# Patient Record
Sex: MALE | Race: ASIAN | NOT HISPANIC OR LATINO | Employment: OTHER | URBAN - METROPOLITAN AREA
[De-identification: names, ages, dates, MRNs, and addresses within clinical notes are randomized per-mention and may not be internally consistent; named-entity substitution may affect disease eponyms.]

---

## 2017-01-25 ENCOUNTER — COMMUNICATION - HEALTHEAST (OUTPATIENT)
Dept: FAMILY MEDICINE | Facility: CLINIC | Age: 67
End: 2017-01-25

## 2017-01-30 ENCOUNTER — OFFICE VISIT - HEALTHEAST (OUTPATIENT)
Dept: FAMILY MEDICINE | Facility: CLINIC | Age: 67
End: 2017-01-30

## 2017-01-30 DIAGNOSIS — R07.89 ATYPICAL CHEST PAIN: ICD-10-CM

## 2017-01-30 DIAGNOSIS — F43.10 POSTTRAUMATIC STRESS DISORDER: ICD-10-CM

## 2017-01-30 ASSESSMENT — MIFFLIN-ST. JEOR: SCORE: 1417.17

## 2017-02-15 ENCOUNTER — OFFICE VISIT - HEALTHEAST (OUTPATIENT)
Dept: FAMILY MEDICINE | Facility: CLINIC | Age: 67
End: 2017-02-15

## 2017-02-15 DIAGNOSIS — R05.9 COUGH: ICD-10-CM

## 2017-02-17 ENCOUNTER — COMMUNICATION - HEALTHEAST (OUTPATIENT)
Dept: FAMILY MEDICINE | Facility: CLINIC | Age: 67
End: 2017-02-17

## 2017-02-21 ENCOUNTER — COMMUNICATION - HEALTHEAST (OUTPATIENT)
Dept: FAMILY MEDICINE | Facility: CLINIC | Age: 67
End: 2017-02-21

## 2017-03-02 ENCOUNTER — OFFICE VISIT - HEALTHEAST (OUTPATIENT)
Dept: FAMILY MEDICINE | Facility: CLINIC | Age: 67
End: 2017-03-02

## 2017-03-02 DIAGNOSIS — R10.9 ABDOMINAL PAIN: ICD-10-CM

## 2017-03-02 DIAGNOSIS — F43.10 POSTTRAUMATIC STRESS DISORDER: ICD-10-CM

## 2017-03-02 ASSESSMENT — MIFFLIN-ST. JEOR: SCORE: 1444.39

## 2017-03-29 ENCOUNTER — COMMUNICATION - HEALTHEAST (OUTPATIENT)
Dept: FAMILY MEDICINE | Facility: CLINIC | Age: 67
End: 2017-03-29

## 2017-03-29 DIAGNOSIS — R05.9 COUGH: ICD-10-CM

## 2017-03-30 ENCOUNTER — AMBULATORY - HEALTHEAST (OUTPATIENT)
Dept: FAMILY MEDICINE | Facility: CLINIC | Age: 67
End: 2017-03-30

## 2017-04-04 ENCOUNTER — RECORDS - HEALTHEAST (OUTPATIENT)
Dept: GENERAL RADIOLOGY | Facility: CLINIC | Age: 67
End: 2017-04-04

## 2017-04-04 ENCOUNTER — OFFICE VISIT - HEALTHEAST (OUTPATIENT)
Dept: FAMILY MEDICINE | Facility: CLINIC | Age: 67
End: 2017-04-04

## 2017-04-04 DIAGNOSIS — R05.9 COUGH: ICD-10-CM

## 2017-04-04 DIAGNOSIS — J18.9 COMMUNITY ACQUIRED PNEUMONIA: ICD-10-CM

## 2017-04-04 DIAGNOSIS — R05.3 CHRONIC COUGH: ICD-10-CM

## 2017-04-04 ASSESSMENT — MIFFLIN-ST. JEOR: SCORE: 1405.83

## 2017-04-13 ENCOUNTER — OFFICE VISIT - HEALTHEAST (OUTPATIENT)
Dept: FAMILY MEDICINE | Facility: CLINIC | Age: 67
End: 2017-04-13

## 2017-04-13 DIAGNOSIS — R05.9 COUGH: ICD-10-CM

## 2017-04-13 DIAGNOSIS — F43.10 PTSD (POST-TRAUMATIC STRESS DISORDER): ICD-10-CM

## 2017-04-13 ASSESSMENT — MIFFLIN-ST. JEOR: SCORE: 1414.9

## 2017-05-30 ENCOUNTER — COMMUNICATION - HEALTHEAST (OUTPATIENT)
Dept: NURSING | Facility: CLINIC | Age: 67
End: 2017-05-30

## 2017-06-07 ENCOUNTER — AMBULATORY - HEALTHEAST (OUTPATIENT)
Dept: CARE COORDINATION | Facility: CLINIC | Age: 67
End: 2017-06-07

## 2017-07-13 ENCOUNTER — OFFICE VISIT - HEALTHEAST (OUTPATIENT)
Dept: FAMILY MEDICINE | Facility: CLINIC | Age: 67
End: 2017-07-13

## 2017-07-13 DIAGNOSIS — F43.10 PTSD (POST-TRAUMATIC STRESS DISORDER): ICD-10-CM

## 2017-07-13 DIAGNOSIS — F33.41 RECURRENT MAJOR DEPRESSIVE DISORDER, IN PARTIAL REMISSION (H): ICD-10-CM

## 2017-07-13 DIAGNOSIS — R20.2 LEFT HAND PARESTHESIA: ICD-10-CM

## 2017-07-13 ASSESSMENT — MIFFLIN-ST. JEOR: SCORE: 1349.7

## 2017-08-29 ENCOUNTER — AMBULATORY - HEALTHEAST (OUTPATIENT)
Dept: LAB | Facility: CLINIC | Age: 67
End: 2017-08-29

## 2017-08-29 DIAGNOSIS — F33.2 MAJOR DEPRESSIVE DISORDER, RECURRENT EPISODE, SEVERE (H): ICD-10-CM

## 2017-08-29 LAB
CHOLEST SERPL-MCNC: 170 MG/DL
FASTING STATUS PATIENT QL REPORTED: NO
HBA1C MFR BLD: 5.9 % (ref 3.5–6)
HDLC SERPL-MCNC: 34 MG/DL
LDLC SERPL CALC-MCNC: 100 MG/DL
TRIGL SERPL-MCNC: 178 MG/DL

## 2017-10-25 ENCOUNTER — COMMUNICATION - HEALTHEAST (OUTPATIENT)
Dept: FAMILY MEDICINE | Facility: CLINIC | Age: 67
End: 2017-10-25

## 2017-10-30 ENCOUNTER — RECORDS - HEALTHEAST (OUTPATIENT)
Dept: ADMINISTRATIVE | Facility: OTHER | Age: 67
End: 2017-10-30

## 2017-11-07 ENCOUNTER — OFFICE VISIT - HEALTHEAST (OUTPATIENT)
Dept: FAMILY MEDICINE | Facility: CLINIC | Age: 67
End: 2017-11-07

## 2017-11-07 DIAGNOSIS — F43.10 PTSD (POST-TRAUMATIC STRESS DISORDER): ICD-10-CM

## 2017-11-07 DIAGNOSIS — B18.1 CHRONIC HEPATITIS B VIRUS INFECTION (H): ICD-10-CM

## 2017-11-07 ASSESSMENT — MIFFLIN-ST. JEOR: SCORE: 1405.26

## 2017-11-09 ENCOUNTER — RECORDS - HEALTHEAST (OUTPATIENT)
Dept: ADMINISTRATIVE | Facility: OTHER | Age: 67
End: 2017-11-09

## 2017-11-27 ENCOUNTER — COMMUNICATION - HEALTHEAST (OUTPATIENT)
Dept: TELEHEALTH | Facility: CLINIC | Age: 67
End: 2017-11-27

## 2017-11-27 ENCOUNTER — HOSPITAL ENCOUNTER (OUTPATIENT)
Dept: ULTRASOUND IMAGING | Facility: HOSPITAL | Age: 67
Discharge: HOME | End: 2017-11-27
Attending: FAMILY MEDICINE

## 2017-11-27 DIAGNOSIS — B18.1 CHRONIC HEPATITIS B VIRUS INFECTION (H): ICD-10-CM

## 2017-12-05 ENCOUNTER — COMMUNICATION - HEALTHEAST (OUTPATIENT)
Dept: FAMILY MEDICINE | Facility: CLINIC | Age: 67
End: 2017-12-05

## 2017-12-12 ENCOUNTER — RECORDS - HEALTHEAST (OUTPATIENT)
Dept: ADMINISTRATIVE | Facility: OTHER | Age: 67
End: 2017-12-12

## 2018-02-12 ENCOUNTER — OFFICE VISIT - HEALTHEAST (OUTPATIENT)
Dept: FAMILY MEDICINE | Facility: CLINIC | Age: 68
End: 2018-02-12

## 2018-02-12 DIAGNOSIS — R03.0 ELEVATED BLOOD PRESSURE READING: ICD-10-CM

## 2018-02-12 DIAGNOSIS — F43.10 PTSD (POST-TRAUMATIC STRESS DISORDER): ICD-10-CM

## 2018-02-12 ASSESSMENT — MIFFLIN-ST. JEOR: SCORE: 1437.02

## 2018-03-28 ENCOUNTER — RECORDS - HEALTHEAST (OUTPATIENT)
Dept: ADMINISTRATIVE | Facility: OTHER | Age: 68
End: 2018-03-28

## 2018-05-07 ENCOUNTER — OFFICE VISIT - HEALTHEAST (OUTPATIENT)
Dept: FAMILY MEDICINE | Facility: CLINIC | Age: 68
End: 2018-05-07

## 2018-05-07 DIAGNOSIS — B18.1 CHRONIC HEPATITIS B VIRUS INFECTION (H): ICD-10-CM

## 2018-05-07 DIAGNOSIS — Z28.39 IMMUNIZATION DEFICIENCY: ICD-10-CM

## 2018-05-07 DIAGNOSIS — R03.0 ELEVATED BLOOD PRESSURE READING: ICD-10-CM

## 2018-05-07 DIAGNOSIS — F43.10 PTSD (POST-TRAUMATIC STRESS DISORDER): ICD-10-CM

## 2018-05-07 LAB — AFP SERPL-MCNC: 4.6 UG/ML

## 2018-05-07 ASSESSMENT — MIFFLIN-ST. JEOR: SCORE: 1435.88

## 2018-05-11 LAB
HBV DNA SERPL NAA+PROBE-ACNC: ABNORMAL [IU]/ML
HBV DNA SERPL NAA+PROBE-LOG IU: 6.4 {LOG_IU}/ML

## 2018-05-15 ENCOUNTER — COMMUNICATION - HEALTHEAST (OUTPATIENT)
Dept: FAMILY MEDICINE | Facility: CLINIC | Age: 68
End: 2018-05-15

## 2018-05-16 ENCOUNTER — COMMUNICATION - HEALTHEAST (OUTPATIENT)
Dept: FAMILY MEDICINE | Facility: CLINIC | Age: 68
End: 2018-05-16

## 2018-05-29 ENCOUNTER — OFFICE VISIT - HEALTHEAST (OUTPATIENT)
Dept: FAMILY MEDICINE | Facility: CLINIC | Age: 68
End: 2018-05-29

## 2018-05-29 DIAGNOSIS — B18.1 CHRONIC HEPATITIS B VIRUS INFECTION (H): ICD-10-CM

## 2018-05-29 DIAGNOSIS — M79.604 RIGHT LEG PAIN: ICD-10-CM

## 2018-05-29 ASSESSMENT — MIFFLIN-ST. JEOR: SCORE: 1417.74

## 2018-06-13 ENCOUNTER — COMMUNICATION - HEALTHEAST (OUTPATIENT)
Dept: FAMILY MEDICINE | Facility: CLINIC | Age: 68
End: 2018-06-13

## 2018-06-13 ENCOUNTER — OFFICE VISIT - HEALTHEAST (OUTPATIENT)
Dept: FAMILY MEDICINE | Facility: CLINIC | Age: 68
End: 2018-06-13

## 2018-06-13 DIAGNOSIS — R55 SYNCOPE, UNSPECIFIED SYNCOPE TYPE: ICD-10-CM

## 2018-06-13 LAB
ALBUMIN SERPL-MCNC: 3.9 G/DL (ref 3.5–5)
ALP SERPL-CCNC: 42 U/L (ref 45–120)
ALT SERPL W P-5'-P-CCNC: 29 U/L (ref 0–45)
ANION GAP SERPL CALCULATED.3IONS-SCNC: 10 MMOL/L (ref 5–18)
AST SERPL W P-5'-P-CCNC: 37 U/L (ref 0–40)
ATRIAL RATE - MUSE: 80 BPM
BILIRUB SERPL-MCNC: 0.8 MG/DL (ref 0–1)
BUN SERPL-MCNC: 17 MG/DL (ref 8–22)
CALCIUM SERPL-MCNC: 9.8 MG/DL (ref 8.5–10.5)
CHLORIDE BLD-SCNC: 104 MMOL/L (ref 98–107)
CO2 SERPL-SCNC: 28 MMOL/L (ref 22–31)
CREAT SERPL-MCNC: 1.2 MG/DL (ref 0.7–1.3)
DIASTOLIC BLOOD PRESSURE - MUSE: NORMAL MMHG
ERYTHROCYTE [DISTWIDTH] IN BLOOD BY AUTOMATED COUNT: 13.2 % (ref 11–14.5)
GFR SERPL CREATININE-BSD FRML MDRD: 60 ML/MIN/1.73M2
GLUCOSE BLD-MCNC: 103 MG/DL (ref 70–125)
HCT VFR BLD AUTO: 47.4 % (ref 40–54)
HGB BLD-MCNC: 15.1 G/DL (ref 14–18)
INTERPRETATION ECG - MUSE: NORMAL
MCH RBC QN AUTO: 25.6 PG (ref 27–34)
MCHC RBC AUTO-ENTMCNC: 31.8 G/DL (ref 32–36)
MCV RBC AUTO: 81 FL (ref 80–100)
P AXIS - MUSE: 47 DEGREES
PLATELET # BLD AUTO: 227 THOU/UL (ref 140–440)
PMV BLD AUTO: 7.4 FL (ref 7–10)
POTASSIUM BLD-SCNC: 4.3 MMOL/L (ref 3.5–5)
PR INTERVAL - MUSE: 174 MS
PROT SERPL-MCNC: 8 G/DL (ref 6–8)
QRS DURATION - MUSE: 86 MS
QT - MUSE: 390 MS
QTC - MUSE: 449 MS
R AXIS - MUSE: 46 DEGREES
RBC # BLD AUTO: 5.89 MILL/UL (ref 4.4–6.2)
SODIUM SERPL-SCNC: 142 MMOL/L (ref 136–145)
SYSTOLIC BLOOD PRESSURE - MUSE: NORMAL MMHG
T AXIS - MUSE: 24 DEGREES
TSH SERPL DL<=0.005 MIU/L-ACNC: 1.92 UIU/ML (ref 0.3–5)
VENTRICULAR RATE- MUSE: 80 BPM
WBC: 8.3 THOU/UL (ref 4–11)

## 2018-06-13 ASSESSMENT — MIFFLIN-ST. JEOR: SCORE: 1422.27

## 2018-11-08 ENCOUNTER — OFFICE VISIT - HEALTHEAST (OUTPATIENT)
Dept: FAMILY MEDICINE | Facility: CLINIC | Age: 68
End: 2018-11-08

## 2018-11-08 DIAGNOSIS — F33.41 RECURRENT MAJOR DEPRESSIVE DISORDER, IN PARTIAL REMISSION (H): ICD-10-CM

## 2018-11-08 DIAGNOSIS — F43.10 PTSD (POST-TRAUMATIC STRESS DISORDER): ICD-10-CM

## 2018-11-08 DIAGNOSIS — R55 SYNCOPE: ICD-10-CM

## 2018-11-08 DIAGNOSIS — Z23 NEED FOR INFLUENZA VACCINATION: ICD-10-CM

## 2018-11-08 DIAGNOSIS — B18.1 CHRONIC HEPATITIS B VIRUS INFECTION (H): ICD-10-CM

## 2018-11-08 ASSESSMENT — MIFFLIN-ST. JEOR: SCORE: 1431.35

## 2018-11-12 ENCOUNTER — AMBULATORY - HEALTHEAST (OUTPATIENT)
Dept: FAMILY MEDICINE | Facility: CLINIC | Age: 68
End: 2018-11-12

## 2018-11-12 DIAGNOSIS — F43.10 PTSD (POST-TRAUMATIC STRESS DISORDER): ICD-10-CM

## 2018-11-13 LAB
HBV DNA SERPL NAA+PROBE-ACNC: 44 [IU]/ML
HBV DNA SERPL NAA+PROBE-LOG IU: 1.6 {LOG_IU}/ML

## 2018-11-14 ENCOUNTER — COMMUNICATION - HEALTHEAST (OUTPATIENT)
Dept: FAMILY MEDICINE | Facility: CLINIC | Age: 68
End: 2018-11-14

## 2018-11-19 ENCOUNTER — COMMUNICATION - HEALTHEAST (OUTPATIENT)
Dept: NURSING | Facility: CLINIC | Age: 68
End: 2018-11-19

## 2018-11-30 ENCOUNTER — AMBULATORY - HEALTHEAST (OUTPATIENT)
Dept: NURSING | Facility: CLINIC | Age: 68
End: 2018-11-30

## 2018-12-04 ENCOUNTER — COMMUNICATION - HEALTHEAST (OUTPATIENT)
Dept: NURSING | Facility: CLINIC | Age: 68
End: 2018-12-04

## 2018-12-06 ENCOUNTER — OFFICE VISIT - HEALTHEAST (OUTPATIENT)
Dept: FAMILY MEDICINE | Facility: CLINIC | Age: 68
End: 2018-12-06

## 2018-12-06 DIAGNOSIS — F43.10 PTSD (POST-TRAUMATIC STRESS DISORDER): ICD-10-CM

## 2018-12-06 DIAGNOSIS — B18.1 CHRONIC HEPATITIS B VIRUS INFECTION (H): ICD-10-CM

## 2018-12-06 ASSESSMENT — MIFFLIN-ST. JEOR: SCORE: 1431.35

## 2018-12-11 ENCOUNTER — HOSPITAL ENCOUNTER (OUTPATIENT)
Dept: ULTRASOUND IMAGING | Facility: HOSPITAL | Age: 68
Discharge: HOME | End: 2018-12-11
Attending: FAMILY MEDICINE

## 2018-12-11 DIAGNOSIS — B18.1 CHRONIC HEPATITIS B VIRUS INFECTION (H): ICD-10-CM

## 2018-12-18 ENCOUNTER — COMMUNICATION - HEALTHEAST (OUTPATIENT)
Dept: NURSING | Facility: CLINIC | Age: 68
End: 2018-12-18

## 2018-12-19 ENCOUNTER — OFFICE VISIT - HEALTHEAST (OUTPATIENT)
Dept: FAMILY MEDICINE | Facility: CLINIC | Age: 68
End: 2018-12-19

## 2018-12-19 DIAGNOSIS — F43.10 PTSD (POST-TRAUMATIC STRESS DISORDER): ICD-10-CM

## 2018-12-19 DIAGNOSIS — N28.89 RENAL MASS, RIGHT: ICD-10-CM

## 2018-12-19 DIAGNOSIS — K74.60 CIRRHOSIS OF LIVER WITHOUT ASCITES, UNSPECIFIED HEPATIC CIRRHOSIS TYPE (H): ICD-10-CM

## 2018-12-19 DIAGNOSIS — B18.1 CHRONIC HEPATITIS B VIRUS INFECTION (H): ICD-10-CM

## 2018-12-19 ASSESSMENT — MIFFLIN-ST. JEOR: SCORE: 1440.64

## 2018-12-21 ENCOUNTER — AMBULATORY - HEALTHEAST (OUTPATIENT)
Dept: CARE COORDINATION | Facility: CLINIC | Age: 68
End: 2018-12-21

## 2018-12-22 ENCOUNTER — HOSPITAL ENCOUNTER (OUTPATIENT)
Dept: CT IMAGING | Facility: HOSPITAL | Age: 68
Discharge: HOME | End: 2018-12-22
Attending: FAMILY MEDICINE

## 2018-12-22 DIAGNOSIS — N28.89 RENAL MASS, RIGHT: ICD-10-CM

## 2018-12-22 LAB
CREAT BLD-MCNC: 1.1 MG/DL
POC GFR AMER AF HE - HISTORICAL: >60 ML/MIN/1.73M2
POC GFR NON AMER AF HE - HISTORICAL: >60 ML/MIN/1.73M2

## 2018-12-26 ENCOUNTER — OFFICE VISIT - HEALTHEAST (OUTPATIENT)
Dept: FAMILY MEDICINE | Facility: CLINIC | Age: 68
End: 2018-12-26

## 2019-01-14 ENCOUNTER — OFFICE VISIT - HEALTHEAST (OUTPATIENT)
Dept: FAMILY MEDICINE | Facility: CLINIC | Age: 69
End: 2019-01-14

## 2019-01-14 DIAGNOSIS — N28.89 RIGHT KIDNEY MASS: ICD-10-CM

## 2019-01-14 DIAGNOSIS — F43.10 PTSD (POST-TRAUMATIC STRESS DISORDER): ICD-10-CM

## 2019-01-14 DIAGNOSIS — B18.1 CHRONIC HEPATITIS B VIRUS INFECTION (H): ICD-10-CM

## 2019-01-14 ASSESSMENT — MIFFLIN-ST. JEOR: SCORE: 1449.65

## 2019-01-15 ENCOUNTER — RECORDS - HEALTHEAST (OUTPATIENT)
Dept: ADMINISTRATIVE | Facility: OTHER | Age: 69
End: 2019-01-15

## 2019-01-23 ENCOUNTER — COMMUNICATION - HEALTHEAST (OUTPATIENT)
Dept: NURSING | Facility: CLINIC | Age: 69
End: 2019-01-23

## 2019-01-24 ENCOUNTER — COMMUNICATION - HEALTHEAST (OUTPATIENT)
Dept: NURSING | Facility: CLINIC | Age: 69
End: 2019-01-24

## 2019-02-06 ENCOUNTER — OFFICE VISIT - HEALTHEAST (OUTPATIENT)
Dept: FAMILY MEDICINE | Facility: CLINIC | Age: 69
End: 2019-02-06

## 2019-02-07 ENCOUNTER — AMBULATORY - HEALTHEAST (OUTPATIENT)
Dept: FAMILY MEDICINE | Facility: CLINIC | Age: 69
End: 2019-02-07

## 2019-02-07 DIAGNOSIS — B18.1 CHRONIC HEPATITIS B VIRUS INFECTION (H): ICD-10-CM

## 2019-02-08 ENCOUNTER — AMBULATORY - HEALTHEAST (OUTPATIENT)
Dept: NURSING | Facility: CLINIC | Age: 69
End: 2019-02-08

## 2019-02-14 ENCOUNTER — COMMUNICATION - HEALTHEAST (OUTPATIENT)
Dept: NURSING | Facility: CLINIC | Age: 69
End: 2019-02-14

## 2019-03-05 ENCOUNTER — COMMUNICATION - HEALTHEAST (OUTPATIENT)
Dept: NURSING | Facility: CLINIC | Age: 69
End: 2019-03-05

## 2019-03-07 ENCOUNTER — COMMUNICATION - HEALTHEAST (OUTPATIENT)
Dept: NURSING | Facility: CLINIC | Age: 69
End: 2019-03-07

## 2019-03-12 ENCOUNTER — OFFICE VISIT - HEALTHEAST (OUTPATIENT)
Dept: FAMILY MEDICINE | Facility: CLINIC | Age: 69
End: 2019-03-12

## 2019-03-12 DIAGNOSIS — B18.1 CHRONIC HEPATITIS B VIRUS INFECTION (H): ICD-10-CM

## 2019-03-12 DIAGNOSIS — K74.60 CIRRHOSIS OF LIVER WITHOUT ASCITES, UNSPECIFIED HEPATIC CIRRHOSIS TYPE (H): ICD-10-CM

## 2019-03-12 DIAGNOSIS — Z28.39 IMMUNIZATION DEFICIENCY: ICD-10-CM

## 2019-03-12 DIAGNOSIS — N28.89 RENAL MASS: ICD-10-CM

## 2019-03-12 DIAGNOSIS — F43.10 PTSD (POST-TRAUMATIC STRESS DISORDER): ICD-10-CM

## 2019-03-12 DIAGNOSIS — Z01.818 PREOPERATIVE EXAMINATION: ICD-10-CM

## 2019-03-12 LAB
ATRIAL RATE - MUSE: 71 BPM
DIASTOLIC BLOOD PRESSURE - MUSE: NORMAL MMHG
ERYTHROCYTE [DISTWIDTH] IN BLOOD BY AUTOMATED COUNT: 12 % (ref 11–14.5)
HCT VFR BLD AUTO: 44.7 % (ref 40–54)
HGB BLD-MCNC: 14.5 G/DL (ref 14–18)
INR PPP: 1.08 (ref 0.9–1.1)
INTERPRETATION ECG - MUSE: NORMAL
MCH RBC QN AUTO: 25.4 PG (ref 27–34)
MCHC RBC AUTO-ENTMCNC: 32.5 G/DL (ref 32–36)
MCV RBC AUTO: 78 FL (ref 80–100)
P AXIS - MUSE: 7 DEGREES
PLATELET # BLD AUTO: 211 THOU/UL (ref 140–440)
PMV BLD AUTO: 8.1 FL (ref 7–10)
PR INTERVAL - MUSE: 146 MS
QRS DURATION - MUSE: 82 MS
QT - MUSE: 384 MS
QTC - MUSE: 417 MS
R AXIS - MUSE: 33 DEGREES
RBC # BLD AUTO: 5.71 MILL/UL (ref 4.4–6.2)
SYSTOLIC BLOOD PRESSURE - MUSE: NORMAL MMHG
T AXIS - MUSE: 12 DEGREES
VENTRICULAR RATE- MUSE: 71 BPM
WBC: 7.2 THOU/UL (ref 4–11)

## 2019-03-12 ASSESSMENT — MIFFLIN-ST. JEOR: SCORE: 1412.64

## 2019-03-13 LAB
ALBUMIN SERPL-MCNC: 4 G/DL (ref 3.5–5)
ALP SERPL-CCNC: 51 U/L (ref 45–120)
ALT SERPL W P-5'-P-CCNC: 17 U/L (ref 0–45)
ANION GAP SERPL CALCULATED.3IONS-SCNC: 12 MMOL/L (ref 5–18)
AST SERPL W P-5'-P-CCNC: 20 U/L (ref 0–40)
BILIRUB SERPL-MCNC: 0.5 MG/DL (ref 0–1)
BUN SERPL-MCNC: 17 MG/DL (ref 8–22)
CALCIUM SERPL-MCNC: 9.8 MG/DL (ref 8.5–10.5)
CHLORIDE BLD-SCNC: 110 MMOL/L (ref 98–107)
CO2 SERPL-SCNC: 22 MMOL/L (ref 22–31)
CREAT SERPL-MCNC: 1.26 MG/DL (ref 0.7–1.3)
GFR SERPL CREATININE-BSD FRML MDRD: 57 ML/MIN/1.73M2
GLUCOSE BLD-MCNC: 89 MG/DL (ref 70–125)
POTASSIUM BLD-SCNC: 4.3 MMOL/L (ref 3.5–5)
PROT SERPL-MCNC: 7.6 G/DL (ref 6–8)
SODIUM SERPL-SCNC: 144 MMOL/L (ref 136–145)

## 2019-03-21 ASSESSMENT — MIFFLIN-ST. JEOR: SCORE: 1412.64

## 2019-03-24 ENCOUNTER — ANESTHESIA - HEALTHEAST (OUTPATIENT)
Dept: SURGERY | Facility: HOSPITAL | Age: 69
End: 2019-03-24

## 2019-03-25 ENCOUNTER — SURGERY - HEALTHEAST (OUTPATIENT)
Dept: SURGERY | Facility: HOSPITAL | Age: 69
End: 2019-03-25

## 2019-03-25 ASSESSMENT — MIFFLIN-ST. JEOR
SCORE: 1389.96
SCORE: 1389.96
SCORE: 1408.1

## 2019-03-26 ASSESSMENT — MIFFLIN-ST. JEOR: SCORE: 1389.96

## 2019-04-04 ENCOUNTER — COMMUNICATION - HEALTHEAST (OUTPATIENT)
Dept: NURSING | Facility: CLINIC | Age: 69
End: 2019-04-04

## 2019-04-09 ENCOUNTER — RECORDS - HEALTHEAST (OUTPATIENT)
Dept: ADMINISTRATIVE | Facility: OTHER | Age: 69
End: 2019-04-09

## 2019-04-18 ENCOUNTER — COMMUNICATION - HEALTHEAST (OUTPATIENT)
Dept: NURSING | Facility: CLINIC | Age: 69
End: 2019-04-18

## 2019-04-29 ENCOUNTER — AMBULATORY - HEALTHEAST (OUTPATIENT)
Dept: CARE COORDINATION | Facility: CLINIC | Age: 69
End: 2019-04-29

## 2019-05-02 ENCOUNTER — OFFICE VISIT - HEALTHEAST (OUTPATIENT)
Dept: FAMILY MEDICINE | Facility: CLINIC | Age: 69
End: 2019-05-02

## 2019-05-02 DIAGNOSIS — F43.10 PTSD (POST-TRAUMATIC STRESS DISORDER): ICD-10-CM

## 2019-05-02 DIAGNOSIS — B18.1 CHRONIC HEPATITIS B VIRUS INFECTION (H): ICD-10-CM

## 2019-05-02 DIAGNOSIS — C64.1 RENAL CELL CARCINOMA, RIGHT (H): ICD-10-CM

## 2019-05-02 ASSESSMENT — MIFFLIN-ST. JEOR: SCORE: 1410.37

## 2019-07-02 ENCOUNTER — OFFICE VISIT - HEALTHEAST (OUTPATIENT)
Dept: FAMILY MEDICINE | Facility: CLINIC | Age: 69
End: 2019-07-02

## 2019-07-02 ENCOUNTER — RECORDS - HEALTHEAST (OUTPATIENT)
Dept: ADMINISTRATIVE | Facility: OTHER | Age: 69
End: 2019-07-02

## 2019-07-02 DIAGNOSIS — B18.1 CHRONIC HEPATITIS B VIRUS INFECTION (H): ICD-10-CM

## 2019-07-02 DIAGNOSIS — F43.10 PTSD (POST-TRAUMATIC STRESS DISORDER): ICD-10-CM

## 2019-07-02 DIAGNOSIS — F33.42 RECURRENT MAJOR DEPRESSIVE DISORDER, IN FULL REMISSION (H): ICD-10-CM

## 2019-07-02 DIAGNOSIS — Z12.11 COLON CANCER SCREENING: ICD-10-CM

## 2019-07-02 ASSESSMENT — MIFFLIN-ST. JEOR: SCORE: 1412.64

## 2019-07-10 ENCOUNTER — COMMUNICATION - HEALTHEAST (OUTPATIENT)
Dept: NURSING | Facility: CLINIC | Age: 69
End: 2019-07-10

## 2019-07-18 ENCOUNTER — COMMUNICATION - HEALTHEAST (OUTPATIENT)
Dept: NURSING | Facility: CLINIC | Age: 69
End: 2019-07-18

## 2019-07-24 ENCOUNTER — COMMUNICATION - HEALTHEAST (OUTPATIENT)
Dept: CARE COORDINATION | Facility: CLINIC | Age: 69
End: 2019-07-24

## 2019-09-05 ENCOUNTER — RECORDS - HEALTHEAST (OUTPATIENT)
Dept: ADMINISTRATIVE | Facility: OTHER | Age: 69
End: 2019-09-05

## 2019-09-05 ENCOUNTER — OFFICE VISIT - HEALTHEAST (OUTPATIENT)
Dept: FAMILY MEDICINE | Facility: CLINIC | Age: 69
End: 2019-09-05

## 2019-09-05 DIAGNOSIS — Z23 IMMUNIZATION DUE: ICD-10-CM

## 2019-09-05 DIAGNOSIS — C64.1 RENAL CELL CARCINOMA, RIGHT (H): ICD-10-CM

## 2019-09-05 DIAGNOSIS — B18.1 CHRONIC HEPATITIS B VIRUS INFECTION (H): ICD-10-CM

## 2019-09-05 DIAGNOSIS — F33.42 RECURRENT MAJOR DEPRESSIVE DISORDER, IN FULL REMISSION (H): ICD-10-CM

## 2019-09-05 ASSESSMENT — MIFFLIN-ST. JEOR: SCORE: 1430.78

## 2019-10-07 ENCOUNTER — AMBULATORY - HEALTHEAST (OUTPATIENT)
Dept: CARE COORDINATION | Facility: CLINIC | Age: 69
End: 2019-10-07

## 2019-10-07 ENCOUNTER — AMBULATORY - HEALTHEAST (OUTPATIENT)
Dept: FAMILY MEDICINE | Facility: CLINIC | Age: 69
End: 2019-10-07

## 2019-10-07 DIAGNOSIS — Z59.71 UNINSURED: ICD-10-CM

## 2019-10-07 DIAGNOSIS — Z59.71 INSURANCE COVERAGE PROBLEMS: ICD-10-CM

## 2019-10-08 ENCOUNTER — RECORDS - HEALTHEAST (OUTPATIENT)
Dept: ADMINISTRATIVE | Facility: OTHER | Age: 69
End: 2019-10-08

## 2019-10-09 ENCOUNTER — COMMUNICATION - HEALTHEAST (OUTPATIENT)
Dept: NURSING | Facility: CLINIC | Age: 69
End: 2019-10-09

## 2019-10-24 ENCOUNTER — COMMUNICATION - HEALTHEAST (OUTPATIENT)
Dept: FAMILY MEDICINE | Facility: CLINIC | Age: 69
End: 2019-10-24

## 2019-11-01 ENCOUNTER — COMMUNICATION - HEALTHEAST (OUTPATIENT)
Dept: NURSING | Facility: CLINIC | Age: 69
End: 2019-11-01

## 2020-01-08 ENCOUNTER — OFFICE VISIT - HEALTHEAST (OUTPATIENT)
Dept: FAMILY MEDICINE | Facility: CLINIC | Age: 70
End: 2020-01-08

## 2020-01-08 DIAGNOSIS — K74.60 CIRRHOSIS OF LIVER WITHOUT ASCITES, UNSPECIFIED HEPATIC CIRRHOSIS TYPE (H): ICD-10-CM

## 2020-01-08 DIAGNOSIS — B18.1 CHRONIC HEPATITIS B VIRUS INFECTION (H): ICD-10-CM

## 2020-01-08 DIAGNOSIS — R73.9 HYPERGLYCEMIA: ICD-10-CM

## 2020-01-08 DIAGNOSIS — C64.1 RENAL CELL CARCINOMA, RIGHT (H): ICD-10-CM

## 2020-01-08 DIAGNOSIS — F33.42 RECURRENT MAJOR DEPRESSIVE DISORDER, IN FULL REMISSION (H): ICD-10-CM

## 2020-01-08 LAB
ALBUMIN SERPL-MCNC: 4.2 G/DL (ref 3.5–5)
ALP SERPL-CCNC: 40 U/L (ref 45–120)
ALT SERPL W P-5'-P-CCNC: 10 U/L (ref 0–45)
ANION GAP SERPL CALCULATED.3IONS-SCNC: 15 MMOL/L (ref 5–18)
AST SERPL W P-5'-P-CCNC: 21 U/L (ref 0–40)
BILIRUB SERPL-MCNC: 0.9 MG/DL (ref 0–1)
BUN SERPL-MCNC: 12 MG/DL (ref 8–22)
CALCIUM SERPL-MCNC: 9.8 MG/DL (ref 8.5–10.5)
CHLORIDE BLD-SCNC: 107 MMOL/L (ref 98–107)
CO2 SERPL-SCNC: 21 MMOL/L (ref 22–31)
CREAT SERPL-MCNC: 1.43 MG/DL (ref 0.7–1.3)
GFR SERPL CREATININE-BSD FRML MDRD: 49 ML/MIN/1.73M2
GLUCOSE BLD-MCNC: 85 MG/DL (ref 70–125)
HBA1C MFR BLD: 6.2 % (ref 3.5–6)
POTASSIUM BLD-SCNC: 4.8 MMOL/L (ref 3.5–5)
PROT SERPL-MCNC: 7.7 G/DL (ref 6–8)
SODIUM SERPL-SCNC: 143 MMOL/L (ref 136–145)

## 2020-01-08 ASSESSMENT — PATIENT HEALTH QUESTIONNAIRE - PHQ9: SUM OF ALL RESPONSES TO PHQ QUESTIONS 1-9: 0

## 2020-01-08 ASSESSMENT — MIFFLIN-ST. JEOR: SCORE: 1419.44

## 2020-01-09 ENCOUNTER — COMMUNICATION - HEALTHEAST (OUTPATIENT)
Dept: NURSING | Facility: CLINIC | Age: 70
End: 2020-01-09

## 2020-01-10 LAB — AFP SERPL-MCNC: 2.2 UG/ML

## 2020-01-11 LAB
HBV DNA SERPL NAA+PROBE-ACNC: <20 [IU]/ML
HBV DNA SERPL NAA+PROBE-LOG IU: <1.3 {LOG_IU}/ML

## 2020-01-13 ENCOUNTER — COMMUNICATION - HEALTHEAST (OUTPATIENT)
Dept: FAMILY MEDICINE | Facility: CLINIC | Age: 70
End: 2020-01-13

## 2020-01-14 ENCOUNTER — HOSPITAL ENCOUNTER (OUTPATIENT)
Dept: ULTRASOUND IMAGING | Facility: HOSPITAL | Age: 70
Discharge: HOME | End: 2020-01-14
Attending: FAMILY MEDICINE

## 2020-01-14 DIAGNOSIS — B18.1 CHRONIC HEPATITIS B VIRUS INFECTION (H): ICD-10-CM

## 2020-01-15 ENCOUNTER — COMMUNICATION - HEALTHEAST (OUTPATIENT)
Dept: FAMILY MEDICINE | Facility: CLINIC | Age: 70
End: 2020-01-15

## 2020-01-15 ENCOUNTER — AMBULATORY - HEALTHEAST (OUTPATIENT)
Dept: FAMILY MEDICINE | Facility: CLINIC | Age: 70
End: 2020-01-15

## 2020-01-15 DIAGNOSIS — N28.9 RENAL LESION: ICD-10-CM

## 2020-01-15 DIAGNOSIS — K76.9 LIVER LESION: ICD-10-CM

## 2020-02-10 ENCOUNTER — OFFICE VISIT - HEALTHEAST (OUTPATIENT)
Dept: FAMILY MEDICINE | Facility: CLINIC | Age: 70
End: 2020-02-10

## 2020-02-10 DIAGNOSIS — E74.39 GLUCOSE INTOLERANCE: ICD-10-CM

## 2020-02-10 DIAGNOSIS — R93.2 ABNORMAL LIVER ULTRASOUND: ICD-10-CM

## 2020-02-10 DIAGNOSIS — M54.50 ACUTE LEFT-SIDED LOW BACK PAIN WITHOUT SCIATICA: ICD-10-CM

## 2020-02-10 DIAGNOSIS — N28.89 RIGHT RENAL MASS: ICD-10-CM

## 2020-02-10 ASSESSMENT — MIFFLIN-ST. JEOR: SCORE: 1426.24

## 2020-02-12 ENCOUNTER — COMMUNICATION - HEALTHEAST (OUTPATIENT)
Dept: FAMILY MEDICINE | Facility: CLINIC | Age: 70
End: 2020-02-12

## 2020-02-12 ENCOUNTER — COMMUNICATION - HEALTHEAST (OUTPATIENT)
Dept: NURSING | Facility: CLINIC | Age: 70
End: 2020-02-12

## 2020-02-12 ENCOUNTER — HOSPITAL ENCOUNTER (OUTPATIENT)
Dept: MRI IMAGING | Facility: HOSPITAL | Age: 70
Discharge: HOME | End: 2020-02-12
Attending: FAMILY MEDICINE

## 2020-02-12 DIAGNOSIS — K76.9 LIVER LESION: ICD-10-CM

## 2020-02-12 DIAGNOSIS — N28.9 RENAL LESION: ICD-10-CM

## 2020-02-12 LAB
CREAT BLD-MCNC: 1.4 MG/DL (ref 0.7–1.3)
GFR SERPL CREATININE-BSD FRML MDRD: 50 ML/MIN/1.73M2

## 2020-04-01 ENCOUNTER — OFFICE VISIT - HEALTHEAST (OUTPATIENT)
Dept: FAMILY MEDICINE | Facility: CLINIC | Age: 70
End: 2020-04-01

## 2020-04-01 DIAGNOSIS — K74.60 CIRRHOSIS OF LIVER WITHOUT ASCITES, UNSPECIFIED HEPATIC CIRRHOSIS TYPE (H): ICD-10-CM

## 2020-04-01 DIAGNOSIS — B18.1 CHRONIC HEPATITIS B VIRUS INFECTION (H): ICD-10-CM

## 2020-04-01 DIAGNOSIS — F43.10 PTSD (POST-TRAUMATIC STRESS DISORDER): ICD-10-CM

## 2020-09-21 ENCOUNTER — COMMUNICATION - HEALTHEAST (OUTPATIENT)
Dept: ADMINISTRATIVE | Facility: CLINIC | Age: 70
End: 2020-09-21

## 2020-09-21 ENCOUNTER — OFFICE VISIT - HEALTHEAST (OUTPATIENT)
Dept: FAMILY MEDICINE | Facility: CLINIC | Age: 70
End: 2020-09-21

## 2020-09-21 DIAGNOSIS — R79.89 ELEVATED SERUM CREATININE: ICD-10-CM

## 2020-09-21 DIAGNOSIS — Z12.11 SCREEN FOR COLON CANCER: ICD-10-CM

## 2020-09-21 DIAGNOSIS — Z28.39 IMMUNIZATION DEFICIENCY: ICD-10-CM

## 2020-09-21 DIAGNOSIS — B18.1 CHRONIC HEPATITIS B VIRUS INFECTION (H): ICD-10-CM

## 2020-09-21 DIAGNOSIS — Z91.89 NEED FOR DENTAL CARE: ICD-10-CM

## 2020-09-21 DIAGNOSIS — K74.60 CIRRHOSIS OF LIVER WITHOUT ASCITES, UNSPECIFIED HEPATIC CIRRHOSIS TYPE (H): ICD-10-CM

## 2020-09-21 DIAGNOSIS — F43.10 PTSD (POST-TRAUMATIC STRESS DISORDER): ICD-10-CM

## 2020-09-21 ASSESSMENT — PATIENT HEALTH QUESTIONNAIRE - PHQ9: SUM OF ALL RESPONSES TO PHQ QUESTIONS 1-9: 0

## 2020-09-21 ASSESSMENT — MIFFLIN-ST. JEOR: SCORE: 1412.64

## 2020-09-23 ENCOUNTER — COMMUNICATION - HEALTHEAST (OUTPATIENT)
Dept: FAMILY MEDICINE | Facility: CLINIC | Age: 70
End: 2020-09-23

## 2020-10-05 LAB
ALBUMIN SERPL-MCNC: 4.3 G/DL (ref 3.5–5)
ALP SERPL-CCNC: 37 U/L (ref 45–120)
ALT SERPL W P-5'-P-CCNC: 12 U/L (ref 0–45)
ANION GAP SERPL CALCULATED.3IONS-SCNC: 8 MMOL/L (ref 5–18)
AST SERPL W P-5'-P-CCNC: 22 U/L (ref 0–40)
BILIRUB SERPL-MCNC: 0.9 MG/DL (ref 0–1)
BUN SERPL-MCNC: 17 MG/DL (ref 8–28)
CALCIUM SERPL-MCNC: 10.1 MG/DL (ref 8.5–10.5)
CHLORIDE BLD-SCNC: 106 MMOL/L (ref 98–107)
CO2 SERPL-SCNC: 29 MMOL/L (ref 22–31)
CREAT SERPL-MCNC: 1.32 MG/DL (ref 0.7–1.3)
GFR SERPL CREATININE-BSD FRML MDRD: 54 ML/MIN/1.73M2
GLUCOSE BLD-MCNC: 113 MG/DL (ref 70–125)
POTASSIUM BLD-SCNC: 4.8 MMOL/L (ref 3.5–5)
PROT SERPL-MCNC: 7.8 G/DL (ref 6–8)
SODIUM SERPL-SCNC: 143 MMOL/L (ref 136–145)

## 2021-03-01 ENCOUNTER — COMMUNICATION - HEALTHEAST (OUTPATIENT)
Dept: FAMILY MEDICINE | Facility: CLINIC | Age: 71
End: 2021-03-01

## 2021-04-06 ENCOUNTER — RECORDS - HEALTHEAST (OUTPATIENT)
Dept: LAB | Facility: CLINIC | Age: 71
End: 2021-04-06

## 2021-04-07 LAB
ANION GAP SERPL CALCULATED.3IONS-SCNC: 10 MMOL/L (ref 5–18)
BUN SERPL-MCNC: 18 MG/DL (ref 8–28)
CALCIUM SERPL-MCNC: 9.3 MG/DL (ref 8.5–10.5)
CHLORIDE BLD-SCNC: 105 MMOL/L (ref 98–107)
CHOLEST SERPL-MCNC: 140 MG/DL
CO2 SERPL-SCNC: 26 MMOL/L (ref 22–31)
CREAT SERPL-MCNC: 1.45 MG/DL (ref 0.7–1.3)
ERYTHROCYTE [DISTWIDTH] IN BLOOD BY AUTOMATED COUNT: 13.2 % (ref 11–14.5)
FASTING STATUS PATIENT QL REPORTED: ABNORMAL
GFR SERPL CREATININE-BSD FRML MDRD: 48 ML/MIN/1.73M2
GLUCOSE BLD-MCNC: 89 MG/DL (ref 70–125)
HCT VFR BLD AUTO: 42.5 % (ref 40–54)
HDLC SERPL-MCNC: 32 MG/DL
HGB BLD-MCNC: 13.3 G/DL (ref 14–18)
LDLC SERPL CALC-MCNC: 82 MG/DL
MCH RBC QN AUTO: 25.3 PG (ref 27–34)
MCHC RBC AUTO-ENTMCNC: 31.3 G/DL (ref 32–36)
MCV RBC AUTO: 81 FL (ref 80–100)
PLATELET # BLD AUTO: 256 THOU/UL (ref 140–440)
PMV BLD AUTO: 10 FL (ref 8.5–12.5)
POTASSIUM BLD-SCNC: 4.1 MMOL/L (ref 3.5–5)
RBC # BLD AUTO: 5.26 MILL/UL (ref 4.4–6.2)
SODIUM SERPL-SCNC: 141 MMOL/L (ref 136–145)
TRIGL SERPL-MCNC: 128 MG/DL
WBC: 6.9 THOU/UL (ref 4–11)

## 2021-04-08 LAB — HBA1C MFR BLD: 6 %

## 2021-05-13 ENCOUNTER — RECORDS - HEALTHEAST (OUTPATIENT)
Dept: ADMINISTRATIVE | Facility: OTHER | Age: 71
End: 2021-05-13

## 2021-05-13 ENCOUNTER — RECORDS - HEALTHEAST (OUTPATIENT)
Dept: SCHEDULING | Facility: CLINIC | Age: 71
End: 2021-05-13

## 2021-05-13 DIAGNOSIS — Z85.528 PERSONAL HISTORY OF OTHER MALIGNANT NEOPLASM OF KIDNEY: ICD-10-CM

## 2021-05-26 ASSESSMENT — PATIENT HEALTH QUESTIONNAIRE - PHQ9: SUM OF ALL RESPONSES TO PHQ QUESTIONS 1-9: 0

## 2021-05-27 ASSESSMENT — PATIENT HEALTH QUESTIONNAIRE - PHQ9: SUM OF ALL RESPONSES TO PHQ QUESTIONS 1-9: 0

## 2021-05-27 NOTE — ANESTHESIA CARE TRANSFER NOTE
Last vitals:   Vitals:    03/25/19 1404   BP: 158/74   Pulse: 66   Resp: 18   Temp: 36.4  C (97.5  F)   SpO2: 100%     Patient's level of consciousness is drowsy but awake. Appears comfortable. Interpretor en route to PACU. VSS.  Spontaneous respirations: yes  Maintains airway independently: yes  Dentition unchanged: yes  Oropharynx: oropharynx clear of all foreign objects    QCDR Measures:  ASA# 20 - Surgical Safety Checklist: WHO surgical safety checklist completed prior to induction    PQRS# 430 - Adult PONV Prevention: 4558F - Pt received => 2 anti-emetic agents (different classes) preop & intraop  ASA# 8 - Peds PONV Prevention: NA - Not pediatric patient, not GA or 2 or more risk factors NOT present  PQRS# 424 - Yessica-op Temp Management: 4559F - At least one body temp DOCUMENTED => 35.5C or 95.9F within required timeframe  PQRS# 426 - PACU Transfer Protocol: - Transfer of care checklist used  ASA# 14 - Acute Post-op Pain: ASA14B - Patient did NOT experience pain >= 7 out of 10

## 2021-05-27 NOTE — TELEPHONE ENCOUNTER
Care Guide arranged transportation for below follow-up appointment with [facility]: [phone] and Care Guide informed patient.    Post-surgery appointment - [facility] at [address] [address]  [phone]    -04/09/2019 at 9:30 AM
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
## 2021-05-27 NOTE — ANESTHESIA POSTPROCEDURE EVALUATION
Patient: Leonel Muniz  ROBOTIC RIGHT PARTIAL NEPHRECTOMY WITH INTRAOPERATIVE ULTRASOUND  Anesthesia type: general    Patient location: PACU  Last vitals:   Vitals:    03/25/19 1450   BP: (!) 173/93   Pulse: (!) 59   Resp: 14   Temp:    SpO2: 100%     Post vital signs: BP higher than I would like (systolic 130 at physical), treating it now  Level of consciousness: responds readily to questions via   Post-anesthesia pain: pain controlled  Post-anesthesia nausea and vomiting: no  Pulmonary: room air  Cardiovascular: as above  Hydration: adequate  Anesthetic events: no    QCDR Measures:  ASA# 11 - Yessica-op Cardiac Arrest: ASA11B - Patient did NOT experience unanticipated cardiac arrest  ASA# 12 - Yessica-op Mortality Rate: ASA12B - Patient did NOT die  ASA# 13 - PACU Re-Intubation Rate: ASA13B - Patient did NOT require a new airway mgmt  ASA# 10 - Composite Anes Safety: ASA10A - No serious adverse event    Additional Notes:

## 2021-05-27 NOTE — PROGRESS NOTES
This Maintenance Wellness Plan provides private information in regards to the work I have done with my Care Team from my Primary Care Clinic.  This document provides insight on the goals I have worked hard to achieve.  My Care Team congratulates me on my journey to become well.  With the assistance of my Clinic Care Guide and Primary Care Physician,  I have succeeded in improving areas of my health that I identified as barriers to becoming well.  I will continue to seek wellness and use the skills I have obtained to further my journey.  My Care Guide will follow up with me in three months.  In the meantime, if I should have any questions or concerns I will contact my Care Guide.      My Clinic Care Coordination Wellness Plan    [facility]  [address] [place]  [address]  [phone]      My Preferred Method of Contact:  Phone: [phone]    My Primary/Preferred Language:  Martiniquais    Preferred Learning Style:  Reading information, Face to face discussion, Pictures/Diagrams and Hands on teaching    Emergency Contact: Extended Emergency Contact Information  Primary Emergency Contact: Monica Quintanilla   [facility]  Home Phone: [phone]  Relation: Friend  Secondary Emergency Contact: NONE, PER PATIENT   [place]  Relation: Declined     PCP:  Dhaval Kirkpatrick MD  Specialists:    [facility]  Accomplishments:  Goals       COMPLETED: I need help to refill my medication, Entecavir monthly. (pt-stated)      Action steps to achieve this goal    1. The Clinic Care Guide will help monthly medication refill for Hep B at [facility] specialty pharmacy.  2. I will call and remind the Care Guide when I am low for monthly refill.     Goal completed: 12/18/2018.  Date goal set:  11/30/2018        COMPLETED: I would like to know my kidney condition regarding previous finding result. (pt-stated)      Action steps to achieve this goal    1. I had my kidney surgery and post  surgery follow up was also done with urologist surgeon.  2. I will continue follow up with my doctor as scheduled.  3. I will continue taking medication as prescribed.  4. I will contact the clinic Care guide if I have questions and concerns.        Goal completed: 04/17/2019.  Date goal set:  12/18/2018.        COMPLETED: I would like to stay healthy both physically and mentally.  (pt-stated)      Action steps to achieve this goal:    1. I will start getting outside once a day and walk in the evening 20 to 30 minutes daily.  2. I will follow up with my doctor in June 2016 and will go see audiology on 4/4/2016.  3. I will continue taking medications for depression and Hallucination daily.  4. I will continue follow up with [facility]'s psychologist and psychiatrist as scheduled.             Advanced Directive/Living Will: The patient was given information regarding Adanced Directives/Living Will    Clinical Emergency Plan:  Understanding Post-Traumatic Stress Disorder (PTSD)  You may have post-traumatic stress disorder (PTSD) if you ve been through a traumatic event and are having trouble dealing with it. Such events may include a car crash, rape, domestic violence,  combat, or violent crime. While it is normal to have some anxiety after such an event, it usually goes away in time. But with PTSD, the anxiety is more intense and keeps coming back. And the trauma is relived through nightmares, intrusive memories, and flashbacks (vivid memories that seem real). The symptoms of PTSD can cause problems with relationships and make it hard to cope with daily life. But it can be treated. With help, you can feel better.  How does it feel?  Symptoms of PTSD often start within a few months of the event. Here are some common symptoms:    You startle more easily, and feel anxious and on edge all the time. This can lead to sleep problems. It a can cause you to feel overwhelmed or become angry or upset more easily. Panic attacks  (sudden, intense feelings of terror and a strong need to escape from wherever you are) can also occur.    You relive the event through nightmares and flashbacks. During these, you may feel strong emotions and as though you re reliving the event all over again.    You avoid people, places, or activities that remind you of the trauma. You may hold in your feelings and become emotionally numb. It may be hard to concentrate at work or school or to relax with friends. You may be afraid to let people get close to you.  Who does it affect?  Not everyone who survives a trauma will have PTSD. But many will. In fact, millions of people have the condition. PTSD can happen to anyone, but it most often develops after a person feels his or her or another s life is threatened.  You re at risk for PTSD if you have experienced or witnessed:    A rape or sexual abuse    A mugging or carjacking    A car accident or plane crash    A life-threatening illness    War    Domestic violence    Childhood abuse    Natural disasters such as earthquakes, floods, or hurricanes    The sudden death of a loved one  Finding help  The first step is to talk to a trusted counselor or healthcare provider. He or she can help you take the next step to treatment. This may involve therapy (also called counseling) and medication.  Are you having suicidal thoughts?  You may be feeling helpless, hopeless, and that you can t go on. You may even have thoughts of suicide. But help is available. There are ways to ease this pain and manage the problems in your life.  If you are thinking about harming or killing yourself, please tell your healthcare provider or someone you care about immediately or go to the nearest crisis walk-in center or emergency room.  You can also call, toll-free,    800-SUICIDE (030-065-7728)     [phone] ([phone])      Resources    American Psychiatric Association  [phone]  www.healthyminds.org    American Psychological  Association  www.apa.org/helpcenter    Anxiety and Depression Association of [place]  www.adaa.org    Mental Health [place]  www.nmha.org    National Center for PTSD  www.ptsd.va.gov    National [place] of Mental Health  www.nimh.nih.gov/topics/xbpne-pybt-uskj.shtml  National Suicide Prevention Lifeline  952.995.9497 (547-931-DGAH)  Www.suicidepreventionlifeline.org   Understanding Anxiety Disorders  Almost everyone gets nervous now and then. It s normal to have knots in your stomach before a test, or for your heart to race on a first date. But an anxiety disorder is much more than a case of nerves. In fact, its symptoms may be overwhelming. But treatment can relieve many of these symptoms. Talking to your healthcare provider is the first step.  What are anxiety disorders?  An anxiety disorder causes intense feelings of panic and fear. These feelings may arise for no apparent reason. And they tend to recur again and again. They may prevent you from coping with life and cause you great distress. As a result, you may avoid anything that triggers your fear. In extreme cases, you may never leave the house. Anxiety disorders may cause other symptoms, such as:    Obsessive thoughts you can t control    Constant nightmares or painful thoughts of the past    Nausea, sweating, and muscle tension    Trouble sleeping or concentrating  What causes anxiety disorders?  Anxiety disorders tend to run in families. For some people, childhood abuse or neglect may play a role. For others, stressful life events or trauma may trigger anxiety disorders. Anxiety can trigger low self-esteem and poor coping skills.     Common anxiety disorders    Panic disorder. This causes an intense fear of being in danger.    Phobias. These are extreme fears of certain objects, places, or events.    Obsessive-compulsive disorder. This causes you to have unwanted thoughts and urges. You also may perform certain actions over and over.    Posttraumatic  stress disorder. This occurs in people who have survived a terrible ordeal. It can cause nightmares and flashbacks about the event.    Generalized anxiety disorder. This causes constant worry that can greatly disrupt your life.   Getting better  You may believe that nothing can help you. Or, you might fear what others may think. But most anxiety symptoms can be eased. Having an anxiety disorder is nothing to be ashamed of. Most people do best with treatment that combines medicine and therapy. These aren t cures. But they can help you live a healthier life.    7968-3811 [employer]. [address] [place] [address]. All rights reserved. This information is not intended as a substitute for professional medical care. Always follow your healthcare professional's instructions.      Emergency Plan Recommendation:     When to Use the Emergency Department (ED)  An emergency means you could die if you don t get care quickly. Or you could be hurt permanently (disabled). Read below to know when to use -- and when not to use -- an emergency department (also called ED).     Dangers to your life  Here are examples of emergencies. These need immediate care:  A hard time breathing  Severe chest pain  Choking  Severe bleeding  Suddenly not able to move or speak  Blacking out (fainting)`  Poisoning     Dangers of permanent injuries  Here are other emergencies. These also need immediate care:  Deep cuts or severe burns  Broken bones, or sudden severe pain and swelling in a joint     When it s an emergency  If you have an emergency, follow these steps:     1. Go to the nearest emergency department  If you can, go to the hospital ED closest to you right away.  If you cannot get there right away, or if it is not safe to take yourself, call 911 or your police emergency number.  2. Call your primary care doctor  Tell your doctor about the emergency. Call within 24 hours of going to the ED.  If you cannot call, have  someone call for you.  Go to your doctor (not the ED) for any follow-up care.     When it s not an emergency  If a problem is not an emergency, follow these steps:     1. Call your primary care doctor  If you don t know the name of your doctor, call your health plan.  If you cannot call, have someone call for you.  2. Follow instructions  Your doctor will tell you what you should do.  You may be told to see your doctor right away. You may be told to go to the ED. Or you may be told to go to an urgent care center.  Follow your doctor s advice.    All [facility] clinic patients have access to a Nurse 24 hours a day, 7 days a week.  If you have questions or want advice from a Nurse, please know [facility] is here for you.  You can call your clinic and they will connect you or you can call [facility] at [phone].  [facility] also has Walk In Care clinics in multiple locations.  Call the number listed above for more information about our Walk In Care clinics or visit the [facility] website at [email or web address].
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
## 2021-05-28 NOTE — PROGRESS NOTES
Hi,    Dr. Kirkpatrick, I am stepping down this patient to maintenance from CCC at my next outreach which patient will get another outreach from me in three months from the next outreach as mentioned. I will then graduate patient if patient has no goal to work on. Patient has auto refill and delivery from [facility] and patient has informed and will remind again to call Care Guide anytime if help needed.

## 2021-05-30 VITALS — WEIGHT: 167 LBS | HEIGHT: 65 IN | BODY MASS INDEX: 27.82 KG/M2

## 2021-05-30 VITALS — WEIGHT: 158.5 LBS | HEIGHT: 65 IN | BODY MASS INDEX: 26.41 KG/M2

## 2021-05-30 VITALS — WEIGHT: 160.5 LBS | BODY MASS INDEX: 26.74 KG/M2 | HEIGHT: 65 IN

## 2021-05-30 VITALS — WEIGHT: 161 LBS | BODY MASS INDEX: 26.82 KG/M2 | HEIGHT: 65 IN

## 2021-05-30 VITALS — WEIGHT: 166.9 LBS | BODY MASS INDEX: 27.77 KG/M2

## 2021-05-30 NOTE — PROGRESS NOTES
[employer]    Open Encounter today.  Episodes created, care management status validated and encounters linked
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
## 2021-05-31 VITALS — BODY MASS INDEX: 24.49 KG/M2 | WEIGHT: 147 LBS | HEIGHT: 65 IN

## 2021-05-31 VITALS — BODY MASS INDEX: 26.53 KG/M2 | HEIGHT: 65 IN | WEIGHT: 159.25 LBS

## 2021-06-01 VITALS — BODY MASS INDEX: 26.99 KG/M2 | WEIGHT: 162 LBS | HEIGHT: 65 IN

## 2021-06-01 VITALS — BODY MASS INDEX: 27.66 KG/M2 | HEIGHT: 65 IN | WEIGHT: 166 LBS

## 2021-06-01 VITALS — HEIGHT: 65 IN | BODY MASS INDEX: 27.7 KG/M2 | WEIGHT: 166.25 LBS

## 2021-06-01 VITALS — BODY MASS INDEX: 27.16 KG/M2 | HEIGHT: 65 IN | WEIGHT: 163 LBS
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
## 2021-06-02 VITALS — HEIGHT: 65 IN | BODY MASS INDEX: 25.83 KG/M2 | WEIGHT: 155 LBS

## 2021-06-02 VITALS — WEIGHT: 167 LBS | BODY MASS INDEX: 27.82 KG/M2 | HEIGHT: 65 IN

## 2021-06-02 VITALS — HEIGHT: 65 IN | WEIGHT: 165 LBS | BODY MASS INDEX: 27.49 KG/M2

## 2021-06-02 VITALS — WEIGHT: 169 LBS | HEIGHT: 65 IN | BODY MASS INDEX: 28.16 KG/M2

## 2021-06-02 VITALS — BODY MASS INDEX: 26.66 KG/M2 | HEIGHT: 65 IN | WEIGHT: 160 LBS

## 2021-06-02 NOTE — TELEPHONE ENCOUNTER
Does PCP feel pt is a good candidate for colon screening with renal cancer in remission?  Last colon screening done in 2009.  Was due 7/28/19.  Pt next apt is 1/8/20.  Thanks.

## 2021-06-02 NOTE — TELEPHONE ENCOUNTER
I see the patient regularly and I will discuss this with him at his next follow-up visit, you do not need to call him.  Thank you.

## 2021-06-02 NOTE — PROGRESS NOTES
Programs Applying For: Health Insurance concerns  County:  Case #:  [facility] Worker:   Lety #:   PMI #:   Tracking:   Date Applied:     Outreach:   10/18/2019: FRW called patient on referral. Patient stated his homemaker requested the renewal last week. FRW will call patient in 1-2 weeks to make sure he received renewal and see if he needs assistance with it.  10/9/2019: Referral Call: Attempt 1 Financial Resource Worker called and left a message for the patient. If the patient is returning my call, please transfer the patient to Charisse Tanner at ext. [phone]      Health Insurance Information:       Referral:   ---FRW ONLY REFERRAL---     I have [place] which shows that patient's health insurance is inactive, please contact patient at your convenience and assist with renewal. Chart review was done by [facility] DOMINICK.     [place] Department of Human Services: [facility] Care Programs   Request processed: 10/07/2019 09:05   SUBSCRIBER INFORMATION   Date of Service Subscriber ID Subscriber Name Birthdate Age Gender   07/07/2019 [id number] THAN BULMARO 1950 69 MALE   Address   [address] ,  , [place] [address]   PROVIDER INFORMATION   Provider ID Submitter Transaction ID Provider Name Taxonomy Code Qualifier Taxonomy Code   [id number] xx [facility] Programs         This subscriber has eligibility for MA: Medical Assistance.       Elig Type EX: Over age 65       Eligibility Begin Date: 05/01/2017       Eligibility End Date: 09/30/2019       This subscriber is eligible for the following service types: Medical Care ,  Chiropractic ,  Dental Care ,  Hospital ,  Hospital - Inpatient ,  Hospital - Outpatient ,  Emergency Services ,  Pharmacy ,  Professional (Physician) Visit - Office ,  Vision (Optometry) ,  Mental Health ,  Urgent Care     Prepaid Health Plan         This subscriber receives MA35 - [place] [employer] Plus (MSC+) delivered through [employer]. The phone number  is: [phone].       The health plan is responsible for paying for Nursing Facility Services.     Other Eligibility Information         No Special Transportation.       No Hospice.       Refer to Health Care Programs and Services Overview of the [facility] Provider Manual for a list of covered services.     Waivers         This subscriber is eligible for the Elderly Waiver.       Elderly Waiver services are the responsibility of the Health Plan.     Subscriber Responsibility Information         An office visit copay of 3 dollars may exist for this subscriber.       A copay of 3.50 dollars for Non-Emergency ER visits may exist for this subscriber.     Restricted Recipient Program   None       Medicare   None       Benefit Limits   Dental      Benefits: Benefit Used:      Last Fluoride Varnish Adult Limited Benefit Set 01/29/2016      Last Full Upper Adult Limited Benefit Set 08/26/2014      Last Periodic Dental Exam 01/29/2016      Last Prophylaxis 01/29/2016   Vision      Benefits: Benefit Used:      Last eyeglasses 01/15/2014       David Flaherty

## 2021-06-02 NOTE — PROGRESS NOTES
Clinic Care Coordination Contact     Situation: Patient chart reviewed by care coordinator.     Background: Needs assistance with insurance renewal.     Assessment: N/A     Plan/Recommendations:   1.) Okay for FRG only referral

## 2021-06-02 NOTE — PROGRESS NOTES
Programs Applying For: Health Insurance concerns  County:  Case #:  [facility] Worker:   Anselmomike #:   PMI #:   Tracking:   Date Applied:     Outreach:   11/1/2019: FRW checked mnits, patient is active with MA. FRW called patient to let him know insurance is active. Patient does not have any medical bills to be processed and insurance is added to Epic. Patient is FRW only, taking patient off panel.   This subscriber has eligibility for MA: Medical Assistance.  Elig Type EX: Over age 65  Eligibility Begin Date: 05/01/2017  Eligibility End Date: --/--/----  This subscriber is eligible for the following service types: Medical Care , Chiropractic , Dental Care , Hospital , Hospital - Inpatient , Hospital - Outpatient , Emergency Services , Pharmacy , Professional (Physician) Visit - Office , Vision (Optometry) , Mental Health , Urgent Care   Prepaid Health Plan   This subscriber receives MA35 - [place] Care Plus (MSC+) delivered through [employer]. The phone number is: [phone].      CLOSED ENCOUNTER:  10/18/2019: FRW called patient on referral. Patient stated his homemaker requested the renewal last week. FRW will call patient in 1-2 weeks to make sure he received renewal and see if he needs assistance with it.  10/9/2019: Referral Call: Attempt 1 Financial Resource Worker called and left a message for the patient. If the patient is returning my call, please transfer the patient to Charisse Tanner at ext. [phone]      Health Insurance Information:       Referral:   ---FRW ONLY REFERRAL---     I have reviewed [place] which shows that patient's health insurance is inactive, please contact patient at your convenience and assist with renewal. Chart review was done by [facility] DOMINICK.     [place] Department [place] Human [place]: [place] [facility] [place] Programs   Request processed: 10/07/2019 09:05   SUBSCRIBER INFORMATION   Date of Service Subscriber ID Subscriber Name Birthdate Age Gender   07/07/2019 [id number] THAN BULMARO 1950 69  MALE   Address   [address] [place] [address] , [facility] [address] , [place] [address]   PROVIDER INFORMATION   Provider ID Submitter Transaction ID Provider Name Taxonomy Code Qualifier Taxonomy Code   [id number] xx [facility] Programs         This subscriber has eligibility for MA: Medical Assistance.       Elig Type EX: Over age 65       Eligibility Begin Date: 05/01/2017       Eligibility End Date: 09/30/2019       This subscriber is eligible for the following service types: Medical Care ,  Chiropractic ,  Dental Care ,  Hospital ,  Hospital - Inpatient ,  Hospital - Outpatient ,  Emergency Services ,  Pharmacy ,  Professional (Physician) Visit - Office ,  Vision (Optometry) ,  Mental Health ,  Urgent Care     Prepaid Health Plan         This subscriber receives MA35 - [place] Plus (MSC+) delivered through [employer]. The phone number is: [phone].       The health plan is responsible for paying for Nursing Facility Services.     Other Eligibility Information         No Special Transportation.       No Hospice.       Refer to Health Care Programs and Services Overview of the [facility] Provider Manual for a list of covered services.     Waivers         This subscriber is eligible for the Elderly Waiver.       Elderly Waiver services are the responsibility of the Health Plan.     Subscriber Responsibility Information         An office visit copay of 3 dollars may exist for this subscriber.       A copay of 3.50 dollars for Non-Emergency ER visits may exist for this subscriber.     Restricted Recipient Program   None       Medicare   None       Benefit Limits   Dental      Benefits: Benefit Used:      Last Fluoride Varnish Adult Limited Benefit Set 01/29/2016      Last Full Upper Adult Limited Benefit Set 08/26/2014      Last Periodic Dental Exam 01/29/2016      Last Prophylaxis 01/29/2016   Vision      Benefits: Benefit Used:      Last eyeglasses 01/15/2014       David Flaherty

## 2021-06-03 VITALS
BODY MASS INDEX: 27.32 KG/M2 | HEIGHT: 65 IN | SYSTOLIC BLOOD PRESSURE: 130 MMHG | HEART RATE: 88 BPM | DIASTOLIC BLOOD PRESSURE: 60 MMHG | TEMPERATURE: 97.5 F | OXYGEN SATURATION: 94 % | RESPIRATION RATE: 20 BRPM | WEIGHT: 164 LBS

## 2021-06-03 VITALS — HEIGHT: 65 IN | BODY MASS INDEX: 26.57 KG/M2 | WEIGHT: 159.5 LBS

## 2021-06-03 VITALS — WEIGHT: 160 LBS | HEIGHT: 65 IN | BODY MASS INDEX: 26.66 KG/M2

## 2021-06-04 VITALS
HEART RATE: 78 BPM | WEIGHT: 161.5 LBS | TEMPERATURE: 97.7 F | OXYGEN SATURATION: 99 % | SYSTOLIC BLOOD PRESSURE: 118 MMHG | BODY MASS INDEX: 26.91 KG/M2 | DIASTOLIC BLOOD PRESSURE: 56 MMHG | RESPIRATION RATE: 24 BRPM | HEIGHT: 65 IN

## 2021-06-04 VITALS
TEMPERATURE: 97.7 F | OXYGEN SATURATION: 98 % | HEART RATE: 81 BPM | WEIGHT: 163 LBS | RESPIRATION RATE: 24 BRPM | SYSTOLIC BLOOD PRESSURE: 130 MMHG | DIASTOLIC BLOOD PRESSURE: 70 MMHG | HEIGHT: 65 IN | BODY MASS INDEX: 27.16 KG/M2

## 2021-06-04 VITALS
SYSTOLIC BLOOD PRESSURE: 128 MMHG | OXYGEN SATURATION: 96 % | TEMPERATURE: 98.4 F | WEIGHT: 160 LBS | BODY MASS INDEX: 26.66 KG/M2 | RESPIRATION RATE: 20 BRPM | HEIGHT: 65 IN | DIASTOLIC BLOOD PRESSURE: 82 MMHG | HEART RATE: 78 BPM

## 2021-06-05 NOTE — TELEPHONE ENCOUNTER
----- Message from Dhaval Kirkpatrick MD sent at 1/9/2020 10:45 AM CST -----  Mumu, please call the patient to let him know that his blood testing shows a slight increase in his kidney test and in his blood sugar test.  He should work on smaller portions of rice, avoiding sugary foods and drinks, good oral hydration with water every day, and we should recheck these blood tests again in 6 months.

## 2021-06-05 NOTE — TELEPHONE ENCOUNTER
Transportation arranged with [facility]: [phone] for both appointments below.    Jan 14, 2020  8:30 AM CST (Arrive by 8:15 AM)     Ultrasound Abdomen Limited with LUDA HARRY 3   [facility] ([facility]) [facility] [facility] [address] [phone]    Appointment: 02/11/2020 (Tuesday)  Time: 10:10 am  Dr. Zambrano    [facility]  [address], [address]  [place] [address]  Phone: [phone]
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
## 2021-06-06 NOTE — TELEPHONE ENCOUNTER
----- Message from Dhaval Kirkpatrick MD sent at 2/12/2020  7:23 PM CST -----  Please inform the patient of good news  he does not have a kidney mass, he does have some kidney cysts which are benign..  There were no concerning spots seen on the liver MRI, he does have some liver cysts which are benign.   He will need liver imaging again in 1 year.

## 2021-06-06 NOTE — TELEPHONE ENCOUNTER
Patient went to his follow up appointment at [facility] yesterday, 02/11/2020 at 10:10 AM, transportation was arranged with [facility] however Dr. Zambrano was out of the office and the appointment is rescheduled below, transportation arranged with [facility] and patient was informed.    Appointment: 03/24/2020 (Tuesday)   Time: 11:50 AM  Dr. Zambrano     [address], [place] [address]   Phone: [phone]    [facility]:  [phone]
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
## 2021-06-08 NOTE — PROGRESS NOTES
ASSESSMENT:   1. Cough  Bordetella pertussis PCR     I have a low suspicion of pertussis as cough is just at night although he states he is coughing hard at times to the point of gagging. Will swab but will hold on treatment at this time. No cough present during exam. He is home without contacts during the day so very minimal exposure to others.     [facility] [place] : Neno Sanders [phone] Ok to call results to him. If positive, send prescription to [facility] pharmacy.     PLAN:  Cough  Likely viral at this point  Will call with pertussis results and tx if positive  Cheratussin for night time results. This medication will cause drowsiness, so no driving, operating machinery or power tools for 6 hours after taking this medication.  Push fluids, get extra rest  Recommend hot tea with lemon/honey, lozenges  May use a cough suppressant or a cool mist humidifier to lessen cough  Keep head of bed elevated to lessen night time cough  Follow up with Dr. Kirkpatrick in 2 wks if cough is not improving or sooner if any new or worsening symptoms.     SUBJECTIVE:   Leonel Muniz is a 66 y.o. male [facility] and has been unchanged. He has had mostly dry cough at night, sore throat, coughs to the point of gagging at times but denies congestion, post nasal drip, myalgias, headache, fever, chills and SOB, dyspnea. Sick contacts: none. Has tried nothing for relief. Nonsmoker. No hx of asthma but has a hx of latent tuberculosis.     Past Medical History:   Diagnosis Date     Auditory hallucination      Cholelithiasis      Chronic hepatitis B virus infection      Hemorrhoids      Hypercholesterolemia      Hypertension      Latent tuberculosis      Onychomycosis      Psychosis      PTSD (post-traumatic stress disorder)      Visual hallucinations        History   Smoking Status     Former Smoker   Smokeless Tobacco     Never Used       Current Medications:  Current Outpatient  Prescriptions   Medication Sig Dispense Refill     mirtazapine (REMERON) 15 MG tablet Take 15 mg by mouth bedtime.       OLANZapine (ZYPREXA) 20 MG tablet 1 tablet bedtime.       prazosin (MINIPRESS) 2 MG capsule Take 2 mg by mouth bedtime.        codeine-guaiFENesin (GUAIFENESIN AC)  mg/5 mL liquid Take 5 mL by mouth 2 (two) times a day as needed for cough. 120 mL 0     No current facility-administered medications for this visit.        Allergies:   No Known Allergies    OBJECTIVE:   Vitals:    02/15/17 1451   BP: 130/80   Pulse: 88   Resp: 20   Temp: 97.9  F (36.6  C)   TempSrc: Oral   SpO2: 98%   Weight: 166 lb 14.4 oz (75.7 kg)     Physical exam reveals a pleasant 66 y.o. male.   Appears alert and cooperative.  Eyes:  SARI, EOMI  Ears:  normal TMs bilaterally and normal canals bilaterally  Nose:    Mucosa normal. Scant, clear rhinorrhea.septum midline, normal mucosa  Mouth:  Mucosa pink and moist.  normal-appearing mucosa and no pharyngitis, no exudate   Neck: normal, supple and no adenopathy  Sinuses: nontender with palpation  Lungs: Chest is clear, no wheezing or rales. Symmetric air entry throughout both lung fields. No cough present.   Heart: regular rate and rhythm, no murmur, rub or gallop

## 2021-06-08 NOTE — PROGRESS NOTES
ASSESMENT AND PLAN:  Diagnoses and all orders for this visit:    Atypical chest pain  Reviewed hospital discharge summary and related documents today from the chart with the patient with the help of a professional .  Medication reconciliation and review and counseling done.  We reviewed the results of his negative troponin tests and his negative cardiac stress test and the patient was counseled and reassured that it appears his chest pain did not come from his heart.  Chest pain has not returned since then and we reviewed indications for follow-up.    Post-traumatic Stress Disorder  Worsening of his sleep control since prazosin dose was decreased from 4 mg at bedtime to 2 mg at bedtime.  Because of the worsening sleep I did recommend he increase back to 4 mg at bedtime until he can follow-up with his psychiatrist within the next 2 weeks.  Patient is in agreement with the plan.        SUBJECTIVE: 66-year-old male here for follow-up on his recent hospitalization for chest pain.  Since discharge from the hospital, he has not had any recurrence of chest pain.  No shortness of breath, no ankle edema, no near syncope, no vomiting, no fevers.  Overall, he's been feeling well except for that he has not been sleeping well since discharge.  Patient has a history of severe posttraumatic stress disorder and associated mental health history.  He is taken care of by a psychiatrist, patient reports that his prazosin had recently been decreased from 4 mg at bedtime down to 2 mg at bedtime because he was having some drowsiness during the daytime.  However, since this change, he has been getting very disrupted sleep.  He reports that he will usually sleep for 2-3 hours and then suddenly wake up and then be unable to fall back asleep again.    Past Medical History   Diagnosis Date     Auditory hallucination      Cholelithiasis      Chronic hepatitis B virus infection      Hemorrhoids      Hypercholesterolemia       "Hypertension      Latent tuberculosis      Onychomycosis      Psychosis      PTSD (post-traumatic stress disorder)      Visual hallucinations      Patient Active Problem List   Diagnosis     Hallucination Visual     Hallucination Auditory     Onychomycosis     Hemorrhoids     Latent Tuberculosis     Post-traumatic Stress Disorder     Hypercholesterolemia     Chronic Hepatitis, B Virus     Low back pain     Severe episode of recurrent major depressive disorder, with psychotic features     Cholelithiasis     Major depressive disorder     Psychosis     Atypical chest pain     Dizziness     TB lung, latent     Current Outpatient Prescriptions   Medication Sig Dispense Refill     mirtazapine (REMERON) 15 MG tablet Take 15 mg by mouth bedtime.       OLANZapine (ZYPREXA) 20 MG tablet 1 tablet bedtime.       prazosin (MINIPRESS) 2 MG capsule Take 2 mg by mouth bedtime.        No current facility-administered medications for this visit.      History   Smoking Status     Former Smoker   Smokeless Tobacco     Never Used       OBJECTICE:   Visit Vitals     /76 (Patient Site: Left Arm, Patient Position: Sitting, Cuff Size: Adult Regular)     Pulse 76     Temp 97.6  F (36.4  C) (Oral)     Resp 24     Ht 5' 5\" (1.651 m)     Wt 161 lb (73 kg)     BMI 26.79 kg/m2        No results found for this or any previous visit (from the past 24 hour(s)).     GEN-alert, appropriate, in no apparent distress   HEENT-mucous membranes are moist, sclera are clear   CV-regular rate and rhythm with no murmur   RESP-lungs clear to auscultation   EXTREM-warm with no edema and no calf tenderness   Psychiatric-appearance is well groomed, speech of normal fluency and rate, mood not depressed, affect normal, thought content negative for suicidal or homicidal ideation, thought processing negative for paranoid or delusional thinking.      Dhaval Kirkpatrick"

## 2021-06-09 NOTE — PROGRESS NOTES
Assessment/Plan:        Diagnoses and all orders for this visit:    Community acquired pneumonia-he has had a dry cough for 2 months.  Cough medicine helps temporarily.On exam, he has decreased breath sounds on the left as compared to the right particularly in the lower half of the lung.  I personally reviewed his x-ray today and it showed some atelectasis in the lung bases particularly on the right.  There was fluid in the fissures bilaterally.  Because this is been going on so long, I chose to treat him with an antibiotic.  He has appointment with Dr. Kirkpatrick  in 10 days and will follow up with it at that time.  -     azithromycin (ZITHROMAX Z-LILLIE) 250 MG tablet; Take 2 tablets (500 mg) on  Day 1,  followed by 1 tablet (250 mg) once daily on Days 2 through 5.  Dispense: 6 tablet; Refill: 0    Chronic cough for 2 months.-  -     XR Chest PA and Lateral; Future; Expected date: 4/4/17  -     guaiFENesin (ROBITUSSIN) 100 mg/5 mL syrup; Take 10 mL by mouth 4 (four) times a day as needed for cough.  Dispense: 240 mL; Refill:    He will continue with his other chronic medications as prescribed.  Subjective:    Patient ID: Leonel Muniz is a 66 y.o. male.    HPI: Patient is here today for a cough that has been going on for 2 months.  He does not cough anything up.  He has not had any fever or chills.  He does not have any history of asthma or any other lung diseases.  He has been taking cough medicine.  That helps for a little while and then the cough comes back.  He has never had a cough that goes on this long before.  There is no one else at home who is sick because he lives by himself.  He has not been exposed to anyone with pneumonia or other infections that he is aware of.    His PCA is with him today.  His PCA spends a couple hours a day with him.    The following portions of the patient's history were reviewed and updated as appropriate: allergies, current medications, past family history, past medical history, past  social history, past surgical history and problem list.    Review of Systems      12 sys rev neg other than HPI    Objective:    Physical Exam   Patient is in no apparent physical distress.  Vitals are as recorded.  Head and face are normal.  Conjunctiva are clear.  Neck is without adenopathy or masses.Thyroid not enlarged.   Cardiovascular :  Regular rate and rhythm with no murmurs.right lung good air movement, nl breath sounds.  Left lung decreased air movement lower half of lung.    Extremities are without edema.  Gait is abnormal.  Skin is without rashes.  Affect abnormal, answers questions appropriately and understands the  well.

## 2021-06-09 NOTE — PROGRESS NOTES
ASSESMENT AND PLAN:  Diagnoses and all orders for this visit:    Abdominal pain  Patient here for his follow-up emergency room visit.  We were able to review those records from care everywhere.  His abdominal pain has now resolved, it was likely musculoskeletal in etiology.  His ER evaluation included a CT scan that was negative and a chest x-ray that was negative.  We reviewed indications for follow-up.    Post-traumatic Stress Disorder  Counseling done today with the patient.  Stable.  Continue current medications and follow-up with [facility].        SUBJECTIVE: 66-year-old male comes in today for follow-up on a recent emergency room visit for abdominal pain.  Patient had also been having some cough.  His cough has improved and his abdominal pain has resolved.  She has a history of PTSD.  He feels his mood has been stable.  He has been sleeping well.  He is taking all his medications regularly and following up regularly for his counseling at [facility].  No fevers, no vomiting, no diarrhea, no shortness of breath.    Past Medical History:   Diagnosis Date     Auditory hallucination      Cholelithiasis      Chronic hepatitis B virus infection      Hemorrhoids      Hypercholesterolemia      Hypertension      Latent tuberculosis      Onychomycosis      Psychosis      PTSD (post-traumatic stress disorder)      Visual hallucinations      Patient Active Problem List   Diagnosis     Hallucination Visual     Hallucination Auditory     Onychomycosis     Hemorrhoids     Latent Tuberculosis     Post-traumatic Stress Disorder     Hypercholesterolemia     Chronic Hepatitis, B Virus     Low back pain     Severe episode of recurrent major depressive disorder, with psychotic features     Cholelithiasis     Major depressive disorder     Psychosis     Atypical chest pain     Dizziness     TB lung, latent     Current Outpatient Prescriptions   Medication Sig Dispense Refill     acetaminophen (TYLENOL) 325 MG tablet Take 650 mg by mouth.        "codeine-guaiFENesin (GUAIFENESIN AC)  mg/5 mL liquid Take 5 mL by mouth 2 (two) times a day as needed for cough. 120 mL 0     guaiFENesin (ROBITUSSIN) 100 mg/5 mL syrup Take 10 mL by mouth.       mirtazapine (REMERON) 15 MG tablet Take 15 mg by mouth bedtime.       OLANZapine (ZYPREXA) 10 MG tablet        OLANZapine (ZYPREXA) 20 MG tablet 1 tablet bedtime.       prazosin (MINIPRESS) 2 MG capsule Take 2 mg by mouth bedtime.        ROBAFEN 100 mg/5 mL syrup        No current facility-administered medications for this visit.      History   Smoking Status     Former Smoker   Smokeless Tobacco     Never Used       OBJECTICE:   Visit Vitals     /84 (Patient Site: Right Arm, Patient Position: Sitting, Cuff Size: Adult Regular)     Pulse 82     Temp 98  F (36.7  C) (Oral)     Resp 16     Ht 5' 5\" (1.651 m)     Wt 167 lb (75.8 kg)     BMI 27.79 kg/m2        No results found for this or any previous visit (from the past 24 hour(s)).     GEN-alert, appropriate, in no apparent distress   CV-regular rate and rhythm with no murmur   RESP-lungs clear to auscultation   ABDOMINAL-soft and nontender to palpation   EXTREM-warm with no edema   Psychiatric-appearance is well groomed, speech is somewhat stuttering, mood is not depressed, affect is normal, thought content negative for suicidal or homicidal ideation.  Thought processing negative for paranoid or delusional thinking.      Dhaval Kirkpatrick          "

## 2021-06-10 NOTE — PROGRESS NOTES
This Graduation Wellness Plan provides private information in regards to the work I have done with my Care Team from my Primary Care Clinic.  This document provides insight on the goals I have accomplished.  My Care Team congratulates me on my journey to maintain wellness.  This document will help guide me on my journey to maintain a healthy lifestyle.  I will use this to help me overcome any barriers I may encounter.  If I should have any questions or concerns, I will continue to contact the members of my Care Team or contact my Primary Care Clinic for assistance.      My Clinic Care Coordination Wellness Plan    [facility]  [address] [place]  [address]  [phone]      My Preferred Method of Contact:  Phone: [phone]    My Primary/Preferred Language:  Elba    Preferred Learning Style:  Reading information, Face to face discussion, Pictures/Diagrams and Hands on teaching    Emergency Contact: Extended Emergency Contact Information  Primary Emergency Contact: SocorroMonica   [facility]  Home Phone: [phone]  Relation: Friend     PCP:  Dhaval Kirkpatrick MD  Specialists:    [facility] and [facility] ACT Team.  Accomplishments:  Goals       COMPLETED: I would like to stay healthy both physically and mentally.  (pt-stated)            Action steps to achieve this goal:    1. I will start getting outside once a day and walk in the evening 20 to 30 minutes daily.  2. I will follow up with my doctor in June 2016 and will go see audiology on 4/4/2016.  3. I will continue taking medications for depression and Hallucination daily.  4. I will continue follow up with [facility]'s psychologist and psychiatrist as scheduled.             Advanced Directive/Living Will: The patient was given information regarding Adanced Directives/Living Will    Clinical Emergency Plan  Knowing When to Seek Treatment  Knowing when to seek treatment for mental health disorders is important for  parents and families. Many times, families, spouses, or friends are the first to suspect that their loved one is challenged by feelings, behaviors, and/or environmental conditions that cause them to act disruptive, rebellious, or sad. This may include, but is not limited to, problems with relationships with friends and/or family members, work, school, sleeping, eating, substance abuse, emotional expression, development, coping, attentiveness, and responsiveness. It's also important to know that people of different ages will exhibit different symptoms and behaviors. Familiarizing yourself with the common behaviors of children, adolescents, and adults that make it hard for them to adapt to situations will often help to identify any problems early when they can be treated. It's important for families who suspect a problem in one, or more, of these areas to seek treatment as soon as possible. Treatment for mental health disorders is available.     What are the symptoms of a potential problem in an adult?  The following are the most common symptoms of a potential emotional, behavioral, and/or developmental problem in an adult, which necessitates a psychiatric evaluation. However, each individual may experience symptoms differently. Symptoms may include:    Significant decline in work performance, poor work attendance, and/or lack of productivity    Social withdrawal from activities, friends, and/or family    Substance (alcohol and drugs) abuse    Sleep disturbances (like persistent nightmares, insomnia, hypersomnia, or flashbacks)    Depression (poor mood, negativity, or mood swings)    Appetite changes (like significant weight gain or loss)    Continuous or frequent aggression    Continuous or frequent anger (for periods longer than 6 months)    Excessive worry and/or anxiety    Threats to self or others    Thoughts of death    Thoughts and/or talk of suicide    Destructive behaviors (like criminal activity, or  "stealing)    Sexually \"acting out\"    Lying and/or cheating    Many physical complaints, including being constantly tense and/or frequent aches and pains that cannot be traced to a physical cause or injury    Sudden feelings of panic, dizziness, or increased heartbeat    Increased feelings of guilt, helplessness, and/or hopelessness    Decreased energy      The symptoms of a potential emotional, behavioral, and/or developmental problem may look like other conditions. Always talk with your child's health care provider for a diagnosis.  All [facility] clinic patients have access to a Nurse 24 hours a day, 7 days a week.  If you have questions or want advice from a Nurse, please know [facility] is here for you.  You can call your clinic and they will connect you or you can call [facility] at [phone].  [facility] also has Walk In Care clinics in multiple locations.  Call the number listed above for more information about our Walk In Care clinics or visit the [facility] website at [email or web address].         "
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
## 2021-06-11 NOTE — TELEPHONE ENCOUNTER
----- Message from Dhaval Kirkpatrick MD sent at 9/21/2020  6:02 PM CDT -----  Please inform the patient that his kidney test has improved compared to last time and that his liver tests are normal.  Thank you.

## 2021-06-11 NOTE — PROGRESS NOTES
Knowing When to Seek Treatment  Knowing when to seek treatment for mental health disorders is important for parents and families. Many times, families, spouses, or friends are the first to suspect that their loved one is challenged by feelings, behaviors, and/or environmental conditions that cause them to act disruptive, rebellious, or sad. This may include, but is not limited to, problems with relationships with friends and/or family members, work, school, sleeping, eating, substance abuse, emotional expression, development, coping, attentiveness, and responsiveness. It's also important to know that people of different ages will exhibit different symptoms and behaviors. Familiarizing yourself with the common behaviors of children, adolescents, and adults that make it hard for them to adapt to situations will often help to identify any problems early when they can be treated. It's important for families who suspect a problem in one, or more, of these areas to seek treatment as soon as possible. Treatment for mental health disorders is available.    What are the symptoms of a potential problem in an adult?  The following are the most common symptoms of a potential emotional, behavioral, and/or developmental problem in an adult, which necessitates a psychiatric evaluation. However, each individual may experience symptoms differently. Symptoms may include:  Significant decline in work performance, poor work attendance, and/or lack of productivity  Social withdrawal from activities, friends, and/or family  Substance (alcohol and drugs) abuse  Sleep disturbances (like persistent nightmares, insomnia, hypersomnia, or flashbacks)  Depression (poor mood, negativity, or mood swings)  Appetite changes (like significant weight gain or loss)  Continuous or frequent aggression  Continuous or frequent anger (for periods longer than 6 months)  Excessive worry and/or anxiety  Threats to self or others  Thoughts of death  Thoughts  "and/or talk of suicide  Destructive behaviors (like criminal activity, or stealing)  Sexually \"acting out\"  Lying and/or cheating  Many physical complaints, including being constantly tense and/or frequent aches and pains that cannot be traced to a physical cause or injury  Sudden feelings of panic, dizziness, or increased heartbeat  Increased feelings of guilt, helplessness, and/or hopelessness  Decreased energy  The symptoms of a potential emotional, behavioral, and/or developmental problem may look like other conditions. Always talk with your child's health care provider for a diagnosis.    "

## 2021-06-11 NOTE — TELEPHONE ENCOUNTER
Pt has Colonoscopy scheduled for 10/6 10:45am arrival w/ Dr. Kraft in [place] Requesting 2 32 oz Clear Oralytes to be sent to [facility]. His colon prep teaching is scheduled for 9/30.
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
## 2021-06-15 NOTE — TELEPHONE ENCOUNTER
Rosetta from [facility] called as pt is new to them.  Set up apt with PCP today which pt NS.  PAO coming, pt was recently in [facility].  Rosetta requested an updated med list.  All we show in the [place] 0.5 mg tab.  Understood. Thanks.
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
## 2021-06-21 NOTE — PROGRESS NOTES
1st attempt, 11/12/2018. Unable to leave messages.    2nd attempt, 11/15/2018. Left a vm for pt to return call.    3rd attempt, 11/19/2018 Left a vm for pt to return call.    Care Guide also called patient's mental health case management at [place] and left messages to help with communication to patient.
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
## 2021-06-22 NOTE — PROGRESS NOTES
Writer called [facility] pharmacy to check on Entercavir 0.5mg status. Per [facility] staff, it's ready for patient to be picked up.  Writer called and notified the patient that he can go  his medication. Patient verbalized understanding.
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
## 2021-06-22 NOTE — PROGRESS NOTES
My Clinic Care Coordination Care Plan    [facility]  [address] [place]  [address]  [phone]    My Preferred Method of Contact:  Phone: [phone]    My Primary/Preferred Language:  Nepalese    Preferred Learning Style:  Reading information, Face to face discussion, Pictures/Diagrams and Hands on teaching    Emergency Contact: Extended Emergency Contact Information  Primary Emergency Contact: Monica Quintanilla   [employer]  Home Phone: [phone]  Relation: Friend     PCP:  Dhaval Kirkpatrick MD  Specialists:    [facility]    Hospitalizations and/or ED Visits  Most Recent: 02/22/2017     Previous:  01/24/2017  Reason:  Abdominal pain and chest pain.    Concerns regarding my health: Chronic Hepatis B, Depression and PTSD.    Advanced Directive/Living Will: The patient was given information regarding Adanced Directives/Living Will    Than elected to enroll in the [facility] Care Coordination.  Than was given a copy of the Clinic Care Coordination brochure and full description of how to access their care team both during clinic hours and after hours.   My Care Guide's Name and Phone Number:  Tommy Win: [phone]   The Care Guide and myself agreed to be in contact every 2 weeks.    Medication Update  The patient's medication list is up to date per CCC RN    Health Maintenance and Immunization Update  The patient's preventive health screenings and immunizations are up to date per PCP.    My Emergency Plan:  Understanding Post-Traumatic Stress Disorder (PTSD)  You may have post-traumatic stress disorder (PTSD) if you ve been through a traumatic event and are having trouble dealing with it. Such events may include a car crash, rape, domestic violence,  combat, or violent crime. While it is normal to have some anxiety after such an event, it usually goes away in time. But with PTSD, the anxiety is more intense and keeps coming back. And the trauma is  relived through nightmares, intrusive memories, and flashbacks (vivid memories that seem real). The symptoms of PTSD can cause problems with relationships and make it hard to cope with daily life. But it can be treated. With help, you can feel better.  How does it feel?  Symptoms of PTSD often start within a few months of the event. Here are some common symptoms:    You startle more easily, and feel anxious and on edge all the time. This can lead to sleep problems. It a can cause you to feel overwhelmed or become angry or upset more easily. Panic attacks (sudden, intense feelings of terror and a strong need to escape from wherever you are) can also occur.    You relive the event through nightmares and flashbacks. During these, you may feel strong emotions and as though you re reliving the event all over again.    You avoid people, places, or activities that remind you of the trauma. You may hold in your feelings and become emotionally numb. It may be hard to concentrate at work or school or to relax with friends. You may be afraid to let people get close to you.  Who does it affect?  Not everyone who survives a trauma will have PTSD. But many will. In fact, millions of people have the condition. PTSD can happen to anyone, but it most often develops after a person feels his or her or another s life is threatened.  You re at risk for PTSD if you have experienced or witnessed:    A rape or sexual abuse    A mugging or carjacking    A car accident or plane crash    A life-threatening illness    War    Domestic violence    Childhood abuse    Natural disasters such as earthquakes, floods, or hurricanes    The sudden death of a loved one  Finding help  The first step is to talk to a trusted counselor or healthcare provider. He or she can help you take the next step to treatment. This may involve therapy (also called counseling) and medication.  Are you having suicidal thoughts?  You may be feeling helpless, hopeless, and  that you can t go on. You may even have thoughts of suicide. But help is available. There are ways to ease this pain and manage the problems in your life.  If you are thinking about harming or killing yourself, please tell your healthcare provider or someone you care about immediately or go to the nearest crisis walk-in center or emergency room.  You can also call, toll-free,    800-SUICIDE (221-799-6163)     583-674-IITK ([phone])    Resources    American Psychiatric Association  169.416.9317  www.healthyminds.org    American Psychological Association  www.apa.org/helpcenter    Anxiety and Depression Association of [place]  www.adaa.org    Mental Health [place]  www.nmha.org    National Center for PTSD  www.ptsd.va.gov    National [place] of Mental Health  www.nimh.nih.gov/topics/iegoi-kvev-zdia.shtml  National Suicide Prevention Lifeline  868.664.9579 (524-861-LOFY)  Www.suicidepreventionlifeline.org   Understanding Anxiety Disorders  Almost everyone gets nervous now and then. It s normal to have knots in your stomach before a test, or for your heart to race on a first date. But an anxiety disorder is much more than a case of nerves. In fact, its symptoms may be overwhelming. But treatment can relieve many of these symptoms. Talking to your healthcare provider is the first step.  What are anxiety disorders?  An anxiety disorder causes intense feelings of panic and fear. These feelings may arise for no apparent reason. And they tend to recur again and again. They may prevent you from coping with life and cause you great distress. As a result, you may avoid anything that triggers your fear. In extreme cases, you may never leave the house. Anxiety disorders may cause other symptoms, such as:    Obsessive thoughts you can t control    Constant nightmares or painful thoughts of the past    Nausea, sweating, and muscle tension    Trouble sleeping or concentrating  What causes anxiety disorders?  Anxiety disorders  tend to run in families. For some people, childhood abuse or neglect may play a role. For others, stressful life events or trauma may trigger anxiety disorders. Anxiety can trigger low self-esteem and poor coping skills.     Common anxiety disorders    Panic disorder. This causes an intense fear of being in danger.    Phobias. These are extreme fears of certain objects, places, or events.    Obsessive-compulsive disorder. This causes you to have unwanted thoughts and urges. You also may perform certain actions over and over.    Posttraumatic stress disorder. This occurs in people who have survived a terrible ordeal. It can cause nightmares and flashbacks about the event.    Generalized anxiety disorder. This causes constant worry that can greatly disrupt your life.   Getting better  You may believe that nothing can help you. Or, you might fear what others may think. But most anxiety symptoms can be eased. Having an anxiety disorder is nothing to be ashamed of. Most people do best with treatment that combines medicine and therapy. These aren t cures. But they can help you live a healthier life.    8205-6426 [employer]. [address] [place] [address]. All rights reserved. This information is not intended as a substitute for professional medical care. Always follow your healthcare professional's instructions.      Emergency Plan Recommendation:  When to Use the Emergency Department (ED)  An emergency means you could die if you don t get care quickly. Or you could be hurt permanently (disabled). Read below to know when to use -- and when not to use -- an emergency department (also called ED).     Dangers to your life  Here are examples of emergencies. These need immediate care:  A hard time breathing  Severe chest pain  Choking  Severe bleeding  Suddenly not able to move or speak  Blacking out (fainting)`  Poisoning     Dangers of permanent injuries  Here are other emergencies. These also need  immediate care:  Deep cuts or severe burns  Broken bones, or sudden severe pain and swelling in a joint     When it s an emergency  If you have an emergency, follow these steps:     1. Go to the nearest emergency department  If you can, go to the hospital ED closest to you right away.  If you cannot get there right away, or if it is not safe to take yourself, call 911 or your police emergency number.  2. Call your primary care doctor  Tell your doctor about the emergency. Call within 24 hours of going to the ED.  If you cannot call, have someone call for you.  Go to your doctor (not the ED) for any follow-up care.     When it s not an emergency  If a problem is not an emergency, follow these steps:     1. Call your primary care doctor  If you don t know the name of your doctor, call your health plan.  If you cannot call, have someone call for you.  2. Follow instructions  Your doctor will tell you what you should do.  You may be told to see your doctor right away. You may be told to go to the ED. Or you may be told to go to an urgent care center.  Follow your doctor s advice.    All [facility] clinic patients have access to a Nurse 24 hours a day, 7 days a week.  If you have questions or want advice from a Nurse, please know [facility] is here for you.  You can call your clinic and they will connect you or you can call Care [facility] at [phone].  [facility] also has Walk In Care clinics in multiple locations.  Call the number listed above for more information about our Walk In Care clinics or visit the [facility] website at [email or web address].    Patient Support  I will ask my emergency contact for help in supporting me in these goals.  Relationship to patient: Friend  Cell # : [phone] , best time to call anytime.
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
## 2021-06-23 NOTE — PROGRESS NOTES
Coordination with [facility]: Care Guide contacted [facility] and spoke with urologist, Dr. Zambrano's nurse and requested a call back from Dr. Zambrano regarding kidney surgery that patient's recent liver lab result ws good. Care Guide will coordinate care for patient to get kidney surgery if Dr. Zambrano gives ok to move forward.          Next outreach: 03/05/2019.

## 2021-06-23 NOTE — PROGRESS NOTES
Writer called [facility] to get an update on patient's prescription status at 8am. Was on hold for 18 mins then the call dropped. Writer called back around 1:30pm and was told they just received the script and will start to work on it right away.     [facility]: [phone].     Writer called back at 3:30pm and was told they needed patient's insurance information sucha s ID #, Group#, RXbin #, PCN# on the insurance card.     Writer was able to do 3-way call with patient and gave insurance info to the pharmacy.     They will process it as soon as possible and will call patient with deliver date. Writer requested to have [facility] [employer]  when they call patient.    States they can deliver med over the weekend but it may take up a few days, maybe the last Monday or Tuesday.     Requested to be deliver as soon as possible because patient is completely out.    Will have [facility] CG f/u on this.

## 2021-06-23 NOTE — PROGRESS NOTES
Care Guide contacted [facility] again this morning they will get it delivered today and will contact the patient once the medication is on its way. Care Guide called patient and informed of these messages.

## 2021-06-23 NOTE — TELEPHONE ENCOUNTER
Care Guide called [facility] and assisted patient with monthly refill for medication, entecavir (BARACLUDE) 0.5 MG tablet. [facility] confirmed that it will ship the medication to local [facility] pharmacy and it should be ready for  by 01/31/2019. Local [facility] pharmacy will call patient once the medication arrive and ready for pick and patient is aware.

## 2021-06-24 NOTE — TELEPHONE ENCOUNTER
Coordination:    Patient is scheduled for kidney with [facility]ist, Dmitry Hector on 03/25/2019 at 10:00 AM arrival and the surgery will take place at [facility]. Patient was told that he will be admitted for couple of days and it will really depend on he'll recover after surgery. Transportation arranged with [employer]:[phone] for one way and patient or  at hospital will need to assist patient to call for return ride once discharged. Transportation also arranged for pre-op with PCP on 03/12/2019 with [facility]: [phone].
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
## 2021-06-27 NOTE — PROGRESS NOTES
Progress Notes by Vilma Loza RN at 4/29/2019 12:22 PM     Author: Vilma Loza RN Service: -- Author Type: Registered Nurse    Filed: 4/29/2019 12:24 PM Encounter Date: 4/29/2019 Status: Signed    : Vilma Loza RN (Registered Nurse)       Tommy Rod Nadia, RN; Delmi Fischer SW; Kailyn Valdovinos SW             [facility] have him on auto monthly refill and call him before delivery.         Thanks,   -Tommy.    Previous Messages      ----- Message -----   From: Vilma Loza RN   Sent: 4/24/2019   3:40 PM   To: Delmi Servin SW, *   Subject: RE: Maintenance Chart Review                     Tommy, I thought you have been calling refill for entecavir (BARACLUDE) 0.5 MG tablet monthly at [facility]. So who's going to call for refill for him and arrange the delivery to [facility]?     I don't want him to step him down until we figure out who's going to call for refill.     The medication only can be filled by some pharmacies.       Thanks.     ----- Message -----   From: Delmi Fischer SW   Sent: 4/18/2019  11:07 AM   To: Vilma Servin RN, *   Subject: RE: Maintenance Chart Review                     SW not involved with this pt, Vilma please review.     ----- Message -----   From: Tommy Rod   Sent: 4/17/2019   4:04 PM   To: Vilma Loza RN, DOMINICK Hui, *   Subject: Maintenance Chart Review                         Hi,     I'd like to move this patient to maintenance who has met his goal and has no other medical or social goal to work on. Patient has been stabled and taking only taking one medication. Patient is well connected to the community resources and attending adult day program 2x days weekly. Please review patient's chart,complete goal and update patient's emergency if needed.       Thanks,   -Tommy.        Emergency plan for maintenance:    Emergency Plan Recommendation:    When to Use the Emergency Department (ED)  An emergency means you could die  if you dont get care quickly. Or you could be hurt permanently (disabled). Read below to know when to use -- and when not to use -- an emergency department (also called ED).    Dangers to your life  Here are examples of emergencies. These need immediate care:  A hard time breathing  Severe chest pain  Choking  Severe bleeding  Suddenly not able to move or speak  Blacking out (fainting)`  Poisoning    Dangers of permanent injuries  Here are other emergencies. These also need immediate care:  Deep cuts or severe burns  Broken bones, or sudden severe pain and swelling in a joint    When its an emergency  If you have an emergency, follow these steps:    1. Go to the nearest emergency department  If you can, go to the hospital ED closest to you right away.  If you cannot get there right away, or if it is not safe to take yourself, call 911 or your police emergency number.  2. Call your primary care doctor  Tell your doctor about the emergency. Call within 24 hours of going to the ED.  If you cannot call, have someone call for you.  Go to your doctor (not the ED) for any follow-up care.    When its not an emergency  If a problem is not an emergency, follow these steps:    1. Call your primary care doctor  If you dont know the name of your doctor, call your health plan.  If you cannot call, have someone call for you.  2. Follow instructions  Your doctor will tell you what you should do.  You may be told to see your doctor right away. You may be told to go to the ED. Or you may be told to go to an urgent care center.  Follow your doctors advice.  Depression  Everyone feels down at times. The blues are a natural part of life. But an unhappy period thats intense or lasts for more than a couple of weeks can be a sign of depression. Depression is a serious illness. It can disrupt the lives of family and friends. If you know someone you think may be depressed, find out what you can do to help.    Know the serious  signals  Warning signals for suicide include:    Threats or talk of suicide    Statements such as I wont be a problem much longer or Nothing matters    Giving away possessions or making a will or  arrangements    Buying a gun or other weapon    Sudden, unexplained cheerfulness or calm after a period of depression  If you notice any of these signs, get help right away. Call a health care professional, mental health clinic, or suicide hotline and ask what action to take. In an emergency, dont hesitate to call the police.    [facility] Mental Health Crisis Lines:  [facility] [phone]  [facility] [phone]  [facility] [phone]  [facility] [phone]  [facility], Adults [phone]  [facility], Children [phone]  [facility], Adults [phone]  [facility], Children [phone]  Understanding Post-Traumatic Stress Disorder (PTSD)  You may have post-traumatic stress disorder (PTSD) if youve been through a traumatic event and are having trouble dealing with it. Such events may include a car crash, rape, domestic violence,  combat, or violent crime. While it is normal to have some anxiety after such an event, it usually goes away in time. But with PTSD, the anxiety is more intense and keeps coming back. And the trauma is relived through nightmares, intrusive memories, and flashbacks (vivid memories that seem real). The symptoms of PTSD can cause problems with relationships and make it hard to cope with daily life. But it can be treated. With help, you can feel better.  How does it feel?  Symptoms of PTSD often start within a few months of the event. Here are some common symptoms:  You startle more easily, and feel anxious and on edge all the time. This can lead to sleep problems. It a can cause you to feel overwhelmed or become angry or upset more easily. Panic attacks (sudden, intense feelings of terror and a strong need to escape from wherever you  are) can also occur.  You relive the event through nightmares and flashbacks. During these, you may feel strong emotions and as though youre reliving the event all over again.  You avoid people, places, or activities that remind you of the trauma. You may hold in your feelings and become emotionally numb. It may be hard to concentrate at work or school or to relax with friends. You may be afraid to let people get close to you.  Who does it affect?  Not everyone who survives a trauma will have PTSD. But many will. In fact, millions of people have the condition. PTSD can happen to anyone, but it most often develops after a person feels his or her or anothers life is threatened.  Youre at risk for PTSD if you have experienced or witnessed:  A rape or sexual abuse  A mugging or carjacking  A car accident or plane crash  A life-threatening illness  War  Domestic violence  Childhood abuse  Natural disasters such as earthquakes, floods, or hurricanes  The sudden death of a loved one  Finding help  The first step is to talk to a trusted counselor or healthcare provider. He or she can help you take the next step to treatment. This may involve therapy (also called counseling) and medication.  Are you having suicidal thoughts?  You may be feeling helpless, hopeless, and that you cant go on. You may even have thoughts of suicide. But help is available. There are ways to ease this pain and manage the problems in your life.  If you are thinking about harming or killing yourself, please tell your healthcare provider or someone you care about immediately or go to the nearest crisis walk-in center or emergency room.  You can also call, toll-free,  800-SUICIDE (522-973-7865)   [phone] ([phone])     Resources  American Psychiatric Association  [phone]  www.healthyminds.org  American Psychological Association  www.apa.org/helpcenter  Anxiety and Depression Association of [place]  www.adaa.org  Mental Health  [place]  www.nmha.org  National Center for PTSD  www.ptsd.va.gov  National [place] of Mental Health  www.[facility].nih.gov/topics/dbilh-blqb-kgvq.shtml  National Suicide Prevention Lifeline  768.628.1345 (276-806-WNPJ)  Www.suicidepreventionlifeline.org

## 2021-06-27 NOTE — PROGRESS NOTES
Progress Notes by Vilma Loza RN at 7/24/2019  4:32 PM     Author: Vilma Loza RN Service: -- Author Type: Registered Nurse    Filed: 7/24/2019  4:36 PM Encounter Date: 7/24/2019 Status: Signed    : Vilma Loza RN (Registered Nurse)       Clinic Care Coordination Contact    Situation: Patient chart reviewed by care coordinator.    Background:   Tommy Rod CHW Dah, Nadia, RN; Kailyn Valdovinos [facility],     This patient is ready to graduate from [employer], patient reported that he's doing well, has no new medical concerns, continues going to school daily, attending adult day program, and receiving medication from [facility] monthly. Please review patient's chart and update emergency plan if needed.       Thanks,   -Tommy.         Assessment:   [employer] RN reviewed chart today. Per PCP's visit notes the past 2 visits, patient has been doing really well.     PCP's note dated on 7/2/19: SUBJECTIVE: 69-year-old male here for follow-up.  He has a complex mental health history but has been doing very well over this past year.  He is now off all of his psychiatric medications and is no longer under any formal psychiatric care.  He is enrolled in a adult day center 2 days/week and goes to a [employer] study/par[employer] on the other days.  He also has a physical exercise regiment that he is doing regularly although he is not regularly getting outside into fresh air/sunlight/nature.  He sleeps well most nights.  He feels his mood is good.  He looks forward to things in his daily life.  He is very happy about the recent good news from his surgery on his kidney cancer.  He is planning to schedule follow-up regularly with his urologist.       Plan/Recommendations:   CHW to f/u standard work for graduation    Emergency Plan Recommendation:    When to Use the Emergency Department (ED)  An emergency means you could die if you dont get care quickly. Or you could be hurt permanently (disabled). Read below to know  when to use -- and when not to use -- an emergency department (also called ED).    Dangers to your life  Here are examples of emergencies. These need immediate care:  A hard time breathing  Severe chest pain  Choking  Severe bleeding  Suddenly not able to move or speak  Blacking out (fainting)`  Poisoning    Dangers of permanent injuries  Here are other emergencies. These also need immediate care:  Deep cuts or severe burns  Broken bones, or sudden severe pain and swelling in a joint    When its an emergency  If you have an emergency, follow these steps:    1. Go to the nearest emergency department  If you can, go to the hospital ED closest to you right away.  If you cannot get there right away, or if it is not safe to take yourself, call 911 or your police emergency number.  2. Call your primary care doctor  Tell your doctor about the emergency. Call within 24 hours of going to the ED.  If you cannot call, have someone call for you.  Go to your doctor (not the ED) for any follow-up care.    When its not an emergency  If a problem is not an emergency, follow these steps:    1. Call your primary care doctor  If you dont know the name of your doctor, call your health plan.  If you cannot call, have someone call for you.  2. Follow instructions  Your doctor will tell you what you should do.  You may be told to see your doctor right away. You may be told to go to the ED. Or you may be told to go to an urgent care center.  Follow your doctors advice.  Understanding Post-Traumatic Stress Disorder (PTSD)  You may have post-traumatic stress disorder (PTSD) if youve been through a traumatic event and are having trouble dealing with it. Such events may include a car crash, rape, domestic violence,  combat, or violent crime. While it is normal to have some anxiety after such an event, it usually goes away in time. But with PTSD, the anxiety is more intense and keeps coming back. And the trauma is relived through  nightmares, intrusive memories, and flashbacks (vivid memories that seem real). The symptoms of PTSD can cause problems with relationships and make it hard to cope with daily life. But it can be treated. With help, you can feel better.  How does it feel?  Symptoms of PTSD often start within a few months of the event. Here are some common symptoms:  You startle more easily, and feel anxious and on edge all the time. This can lead to sleep problems. It a can cause you to feel overwhelmed or become angry or upset more easily. Panic attacks (sudden, intense feelings of terror and a strong need to escape from wherever you are) can also occur.  You relive the event through nightmares and flashbacks. During these, you may feel strong emotions and as though youre reliving the event all over again.  You avoid people, places, or activities that remind you of the trauma. You may hold in your feelings and become emotionally numb. It may be hard to concentrate at work or school or to relax with friends. You may be afraid to let people get close to you.  Who does it affect?  Not everyone who survives a trauma will have PTSD. But many will. In fact, millions of people have the condition. PTSD can happen to anyone, but it most often develops after a person feels his or her or anothers life is threatened.  Youre at risk for PTSD if you have experienced or witnessed:  A rape or sexual abuse  A mugging or carjacking  A car accident or plane crash  A life-threatening illness  War  Domestic violence  Childhood abuse  Natural disasters such as earthquakes, floods, or hurricanes  The sudden death of a loved one  Finding help  The first step is to talk to a trusted counselor or healthcare provider. He or she can help you take the next step to treatment. This may involve therapy (also called counseling) and medication.  Are you having suicidal thoughts?  You may be feeling helpless, hopeless, and that you cant go on. You may even have  thoughts of suicide. But help is available. There are ways to ease this pain and manage the problems in your life.  If you are thinking about harming or killing yourself, please tell your healthcare provider or someone you care about immediately or go to the nearest crisis walk-in center or emergency room.  You can also call, toll-free,  800-SUICIDE (755-637-1383)   176-153-DNOK ([phone])     Resources  American Psychiatric Association  [phone]  www.healthyminds.org  American Psychological Association  www.apa.org/helpcenter  Anxiety and Depression Association of [place]  www.adaa.org  Mental Health [place]  www.nmha.org  National Center for PTSD  www.ptsd.va.gov  National [place] of Mental Health  www.nimh.nih.gov/topics/ynatc-ahpv-peln.shtml  National Suicide Prevention Lifeline  181.428.6483 (041-480-NCRU)  Www.suicidepreventionlifeline.org

## 2021-07-03 NOTE — ADDENDUM NOTE
Addendum Note by Vivi Perez MD at 4/4/2017  5:00 PM     Author: Vivi Perez MD Service: -- Author Type: Physician    Filed: 4/4/2017  5:00 PM Encounter Date: 4/4/2017 Status: Signed    : Vivi Perez MD (Physician)    Addended by: VIVI PEREZ on: 4/4/2017 05:00 PM        Modules accepted: Orders

## 2021-08-17 ENCOUNTER — TRANSFERRED RECORDS (OUTPATIENT)
Dept: HEALTH INFORMATION MANAGEMENT | Facility: CLINIC | Age: 71
End: 2021-08-17

## 2021-09-02 PROBLEM — F33.2 MAJOR DEPRESSIVE DISORDER, RECURRENT EPISODE, SEVERE (H): Status: ACTIVE | Noted: 2021-09-02

## 2021-09-02 PROBLEM — F06.70 MILD NEUROCOGNITIVE DISORDER DUE TO MULTIPLE ETIOLOGIES: Status: ACTIVE | Noted: 2021-09-02

## 2022-07-04 ENCOUNTER — LAB REQUISITION (OUTPATIENT)
Dept: LAB | Facility: CLINIC | Age: 72
End: 2022-07-04
Payer: COMMERCIAL

## 2022-07-04 DIAGNOSIS — I10 ESSENTIAL (PRIMARY) HYPERTENSION: ICD-10-CM

## 2022-07-04 DIAGNOSIS — E75.5 OTHER LIPID STORAGE DISORDERS: ICD-10-CM

## 2022-07-05 LAB
ALBUMIN SERPL BCG-MCNC: 4.4 G/DL (ref 3.5–5.2)
ALP SERPL-CCNC: 25 U/L (ref 40–129)
ALT SERPL W P-5'-P-CCNC: <5 U/L (ref 10–50)
ANION GAP SERPL CALCULATED.3IONS-SCNC: 11 MMOL/L (ref 7–15)
AST SERPL W P-5'-P-CCNC: 20 U/L (ref 10–50)
BILIRUB SERPL-MCNC: 0.6 MG/DL
BUN SERPL-MCNC: 11.6 MG/DL (ref 8–23)
CALCIUM SERPL-MCNC: 10.2 MG/DL (ref 8.8–10.2)
CHLORIDE SERPL-SCNC: 104 MMOL/L (ref 98–107)
CHOLEST SERPL-MCNC: 134 MG/DL
CREAT SERPL-MCNC: 1.08 MG/DL (ref 0.67–1.17)
DEPRECATED HCO3 PLAS-SCNC: 28 MMOL/L (ref 22–29)
ERYTHROCYTE [DISTWIDTH] IN BLOOD BY AUTOMATED COUNT: 13 % (ref 10–15)
GFR SERPL CREATININE-BSD FRML MDRD: 73 ML/MIN/1.73M2
GLUCOSE SERPL-MCNC: 106 MG/DL (ref 70–99)
HCT VFR BLD AUTO: 43.2 % (ref 40–53)
HDLC SERPL-MCNC: 43 MG/DL
HGB BLD-MCNC: 13.5 G/DL (ref 13.3–17.7)
LDLC SERPL CALC-MCNC: 68 MG/DL
MCH RBC QN AUTO: 26 PG (ref 26.5–33)
MCHC RBC AUTO-ENTMCNC: 31.3 G/DL (ref 31.5–36.5)
MCV RBC AUTO: 83 FL (ref 78–100)
NONHDLC SERPL-MCNC: 91 MG/DL
PLATELET # BLD AUTO: 222 10E3/UL (ref 150–450)
POTASSIUM SERPL-SCNC: 4 MMOL/L (ref 3.4–5.3)
PROT SERPL-MCNC: 7.7 G/DL (ref 6.4–8.3)
RBC # BLD AUTO: 5.19 10E6/UL (ref 4.4–5.9)
SODIUM SERPL-SCNC: 143 MMOL/L (ref 136–145)
TRIGL SERPL-MCNC: 114 MG/DL
WBC # BLD AUTO: 6.2 10E3/UL (ref 4–11)

## 2022-07-05 PROCEDURE — 80053 COMPREHEN METABOLIC PANEL: CPT | Mod: ORL

## 2022-07-05 PROCEDURE — P9604 ONE-WAY ALLOW PRORATED TRIP: HCPCS | Mod: ORL

## 2022-07-05 PROCEDURE — 36415 COLL VENOUS BLD VENIPUNCTURE: CPT | Mod: ORL

## 2022-07-05 PROCEDURE — 85027 COMPLETE CBC AUTOMATED: CPT | Mod: ORL

## 2022-07-05 PROCEDURE — 80061 LIPID PANEL: CPT | Mod: ORL

## 2022-09-21 ENCOUNTER — LAB REQUISITION (OUTPATIENT)
Dept: LAB | Facility: CLINIC | Age: 72
End: 2022-09-21
Payer: COMMERCIAL

## 2022-09-21 DIAGNOSIS — F20.89 OTHER SCHIZOPHRENIA (H): ICD-10-CM

## 2022-09-23 LAB — HBA1C MFR BLD: 5.7 %

## 2022-09-23 PROCEDURE — P9604 ONE-WAY ALLOW PRORATED TRIP: HCPCS | Mod: ORL

## 2022-09-23 PROCEDURE — 36415 COLL VENOUS BLD VENIPUNCTURE: CPT | Mod: ORL

## 2022-09-23 PROCEDURE — 83036 HEMOGLOBIN GLYCOSYLATED A1C: CPT | Mod: ORL

## 2022-11-10 ENCOUNTER — LAB REQUISITION (OUTPATIENT)
Dept: LAB | Facility: CLINIC | Age: 72
End: 2022-11-10
Payer: COMMERCIAL

## 2022-11-10 LAB
ALBUMIN UR-MCNC: NEGATIVE MG/DL
APPEARANCE UR: CLEAR
BACTERIA #/AREA URNS HPF: ABNORMAL /HPF
BILIRUB UR QL STRIP: NEGATIVE
COLOR UR AUTO: YELLOW
GLUCOSE UR STRIP-MCNC: NEGATIVE MG/DL
HGB UR QL STRIP: NEGATIVE
KETONES UR STRIP-MCNC: 10 MG/DL
LEUKOCYTE ESTERASE UR QL STRIP: NEGATIVE
MUCOUS THREADS #/AREA URNS LPF: PRESENT /LPF
NITRATE UR QL: POSITIVE
PH UR STRIP: 5.5 [PH] (ref 5–7)
RBC URINE: <1 /HPF
SP GR UR STRIP: 1.02 (ref 1–1.03)
UROBILINOGEN UR STRIP-MCNC: NORMAL MG/DL
WBC URINE: 4 /HPF

## 2022-11-10 PROCEDURE — 81001 URINALYSIS AUTO W/SCOPE: CPT | Mod: ORL

## 2022-11-10 PROCEDURE — 87086 URINE CULTURE/COLONY COUNT: CPT | Mod: ORL

## 2022-11-12 LAB — BACTERIA UR CULT: ABNORMAL

## 2022-12-21 ENCOUNTER — LAB REQUISITION (OUTPATIENT)
Dept: LAB | Facility: CLINIC | Age: 72
End: 2022-12-21
Payer: COMMERCIAL

## 2022-12-21 DIAGNOSIS — R63.4 ABNORMAL WEIGHT LOSS: ICD-10-CM

## 2022-12-23 LAB — TSH SERPL DL<=0.005 MIU/L-ACNC: 2.07 UIU/ML (ref 0.3–4.2)

## 2022-12-23 PROCEDURE — 84443 ASSAY THYROID STIM HORMONE: CPT | Mod: ORL | Performed by: PHYSICIAN ASSISTANT

## 2022-12-23 PROCEDURE — P9604 ONE-WAY ALLOW PRORATED TRIP: HCPCS | Mod: ORL | Performed by: PHYSICIAN ASSISTANT

## 2022-12-23 PROCEDURE — 36415 COLL VENOUS BLD VENIPUNCTURE: CPT | Mod: ORL | Performed by: PHYSICIAN ASSISTANT

## 2023-02-01 ENCOUNTER — TRANSFERRED RECORDS (OUTPATIENT)
Dept: HEALTH INFORMATION MANAGEMENT | Facility: CLINIC | Age: 73
End: 2023-02-01

## 2023-06-09 ENCOUNTER — MEDICAL CORRESPONDENCE (OUTPATIENT)
Dept: HEALTH INFORMATION MANAGEMENT | Facility: CLINIC | Age: 73
End: 2023-06-09

## 2023-09-03 ENCOUNTER — HOSPITAL ENCOUNTER (EMERGENCY)
Facility: CLINIC | Age: 73
Discharge: OTHER INSTITUTION | End: 2023-09-03
Attending: STUDENT IN AN ORGANIZED HEALTH CARE EDUCATION/TRAINING PROGRAM | Admitting: STUDENT IN AN ORGANIZED HEALTH CARE EDUCATION/TRAINING PROGRAM
Payer: COMMERCIAL

## 2023-09-03 VITALS
SYSTOLIC BLOOD PRESSURE: 150 MMHG | BODY MASS INDEX: 21.8 KG/M2 | HEART RATE: 72 BPM | RESPIRATION RATE: 16 BRPM | TEMPERATURE: 98.1 F | WEIGHT: 131 LBS | OXYGEN SATURATION: 98 % | DIASTOLIC BLOOD PRESSURE: 63 MMHG

## 2023-09-03 DIAGNOSIS — R45.1 AGITATION: ICD-10-CM

## 2023-09-03 LAB
ALBUMIN UR-MCNC: NEGATIVE MG/DL
ANION GAP SERPL CALCULATED.3IONS-SCNC: 4 MMOL/L (ref 5–18)
APPEARANCE UR: CLEAR
BASOPHILS # BLD AUTO: 0 10E3/UL (ref 0–0.2)
BASOPHILS NFR BLD AUTO: 1 %
BILIRUB UR QL STRIP: NEGATIVE
BUN SERPL-MCNC: 21 MG/DL (ref 8–28)
CALCIUM SERPL-MCNC: 9.2 MG/DL (ref 8.5–10.5)
CHLORIDE BLD-SCNC: 108 MMOL/L (ref 98–107)
CO2 SERPL-SCNC: 27 MMOL/L (ref 22–31)
COLOR UR AUTO: COLORLESS
CREAT SERPL-MCNC: 1.19 MG/DL (ref 0.7–1.3)
EOSINOPHIL # BLD AUTO: 0.3 10E3/UL (ref 0–0.7)
EOSINOPHIL NFR BLD AUTO: 6 %
ERYTHROCYTE [DISTWIDTH] IN BLOOD BY AUTOMATED COUNT: 12.8 % (ref 10–15)
GFR SERPL CREATININE-BSD FRML MDRD: 64 ML/MIN/1.73M2
GLUCOSE BLD-MCNC: 97 MG/DL (ref 70–125)
GLUCOSE UR STRIP-MCNC: NEGATIVE MG/DL
HCT VFR BLD AUTO: 39.3 % (ref 40–53)
HGB BLD-MCNC: 12.4 G/DL (ref 13.3–17.7)
HGB UR QL STRIP: NEGATIVE
IMM GRANULOCYTES # BLD: 0 10E3/UL
IMM GRANULOCYTES NFR BLD: 0 %
KETONES UR STRIP-MCNC: NEGATIVE MG/DL
LEUKOCYTE ESTERASE UR QL STRIP: NEGATIVE
LYMPHOCYTES # BLD AUTO: 1.1 10E3/UL (ref 0.8–5.3)
LYMPHOCYTES NFR BLD AUTO: 19 %
MCH RBC QN AUTO: 26.3 PG (ref 26.5–33)
MCHC RBC AUTO-ENTMCNC: 31.6 G/DL (ref 31.5–36.5)
MCV RBC AUTO: 83 FL (ref 78–100)
MONOCYTES # BLD AUTO: 0.6 10E3/UL (ref 0–1.3)
MONOCYTES NFR BLD AUTO: 11 %
MUCOUS THREADS #/AREA URNS LPF: PRESENT /LPF
NEUTROPHILS # BLD AUTO: 3.6 10E3/UL (ref 1.6–8.3)
NEUTROPHILS NFR BLD AUTO: 63 %
NITRATE UR QL: NEGATIVE
NRBC # BLD AUTO: 0 10E3/UL
NRBC BLD AUTO-RTO: 0 /100
PH UR STRIP: 6.5 [PH] (ref 5–7)
PLATELET # BLD AUTO: 170 10E3/UL (ref 150–450)
POTASSIUM BLD-SCNC: 3.6 MMOL/L (ref 3.5–5)
RBC # BLD AUTO: 4.71 10E6/UL (ref 4.4–5.9)
RBC URINE: <1 /HPF
SODIUM SERPL-SCNC: 139 MMOL/L (ref 136–145)
SP GR UR STRIP: 1.01 (ref 1–1.03)
UROBILINOGEN UR STRIP-MCNC: <2 MG/DL
WBC # BLD AUTO: 5.6 10E3/UL (ref 4–11)
WBC URINE: <1 /HPF

## 2023-09-03 PROCEDURE — 85025 COMPLETE CBC W/AUTO DIFF WBC: CPT | Performed by: STUDENT IN AN ORGANIZED HEALTH CARE EDUCATION/TRAINING PROGRAM

## 2023-09-03 PROCEDURE — 81001 URINALYSIS AUTO W/SCOPE: CPT | Performed by: STUDENT IN AN ORGANIZED HEALTH CARE EDUCATION/TRAINING PROGRAM

## 2023-09-03 PROCEDURE — 36415 COLL VENOUS BLD VENIPUNCTURE: CPT | Performed by: STUDENT IN AN ORGANIZED HEALTH CARE EDUCATION/TRAINING PROGRAM

## 2023-09-03 PROCEDURE — 80048 BASIC METABOLIC PNL TOTAL CA: CPT | Performed by: STUDENT IN AN ORGANIZED HEALTH CARE EDUCATION/TRAINING PROGRAM

## 2023-09-03 PROCEDURE — 99283 EMERGENCY DEPT VISIT LOW MDM: CPT

## 2023-09-03 ASSESSMENT — ACTIVITIES OF DAILY LIVING (ADL)
ADLS_ACUITY_SCORE: 35
ADLS_ACUITY_SCORE: 35

## 2023-09-03 NOTE — ED NOTES
Bed: [facility]01  Expected date: 9/3/23  Expected time:   Means of arrival: Ambulance  Comments:  Dylon 72 yo male behaviorial issue

## 2023-09-03 NOTE — ED NOTES
Called [facility] twice and unable to get a hold of rn. Left message with her phone and with .
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
## 2023-09-03 NOTE — ED NOTES
Pt still calm and cooperative. Pt used urinal in room. Ambulating independently. Very appreciative. Tucked pt back into bed and turned off lights.

## 2023-09-03 NOTE — ED NOTES
Spoke to emily at [facility]. Okay with plan to send pt back. Gave report. Charge calling ems to get pt back home.
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
## 2025-02-22 NOTE — TELEPHONE ENCOUNTER
Care Guide called [facility] surgery coordinator to coordinate surgery for patient. Ruchi will call back with date/time and location for surgery and once confirmed, Care Guide to contact the patient for pre-op with PCP and directed contact was given to Ruchi.  
calm

## 2025-03-12 NOTE — TELEPHONE ENCOUNTER
----- Message from Dhaval Kirkpatrick MD sent at 1/15/2020  4:57 PM CST -----  I called and discussed the case with the radiologist at [facility].  She recommended ordering the MRI with contrast to best characterize the liver and kidney lesions seen on ultrasound.  Please contact the patient to let him know about the small abnormalities on the kidney and the liver that we are going to get more detailed information on from the MRI scan.  
CMT called the number listed and the voicemail message is in English.   
Called pt and relayed MRI is needed.  Can speciality please schedule MRI for pt?  Thanks.   
Left message to return for Ultrasound result. Please relay the provider message when pt call back.  
Msg was left for pt, will follow order from [facility].  
show